# Patient Record
Sex: FEMALE | Race: OTHER | Employment: UNEMPLOYED | URBAN - METROPOLITAN AREA
[De-identification: names, ages, dates, MRNs, and addresses within clinical notes are randomized per-mention and may not be internally consistent; named-entity substitution may affect disease eponyms.]

---

## 2021-05-18 ENCOUNTER — APPOINTMENT (OUTPATIENT)
Dept: RADIOLOGY | Facility: MEDICAL CENTER | Age: 51
End: 2021-05-18
Attending: INTERNAL MEDICINE

## 2021-06-04 ENCOUNTER — HOSPITAL ENCOUNTER (OUTPATIENT)
Dept: LAB | Facility: MEDICAL CENTER | Age: 51
End: 2021-06-04
Attending: INTERNAL MEDICINE
Payer: COMMERCIAL

## 2021-06-04 LAB
ALBUMIN SERPL BCP-MCNC: 4.4 G/DL (ref 3.2–4.9)
ALBUMIN/GLOB SERPL: 1.6 G/DL
ALP SERPL-CCNC: 53 U/L (ref 30–99)
ALT SERPL-CCNC: 13 U/L (ref 2–50)
ANION GAP SERPL CALC-SCNC: 11 MMOL/L (ref 7–16)
AST SERPL-CCNC: 17 U/L (ref 12–45)
BASOPHILS # BLD AUTO: 0.8 % (ref 0–1.8)
BASOPHILS # BLD: 0.04 K/UL (ref 0–0.12)
BILIRUB SERPL-MCNC: 0.6 MG/DL (ref 0.1–1.5)
BUN SERPL-MCNC: 20 MG/DL (ref 8–22)
CALCIUM SERPL-MCNC: 9.4 MG/DL (ref 8.4–10.2)
CHLORIDE SERPL-SCNC: 103 MMOL/L (ref 96–112)
CHOLEST SERPL-MCNC: 230 MG/DL (ref 100–199)
CO2 SERPL-SCNC: 25 MMOL/L (ref 20–33)
CREAT SERPL-MCNC: 0.64 MG/DL (ref 0.5–1.4)
EOSINOPHIL # BLD AUTO: 0.36 K/UL (ref 0–0.51)
EOSINOPHIL NFR BLD: 7.6 % (ref 0–6.9)
ERYTHROCYTE [DISTWIDTH] IN BLOOD BY AUTOMATED COUNT: 42.8 FL (ref 35.9–50)
FASTING STATUS PATIENT QL REPORTED: NORMAL
GLOBULIN SER CALC-MCNC: 2.7 G/DL (ref 1.9–3.5)
GLUCOSE SERPL-MCNC: 92 MG/DL (ref 65–99)
HCT VFR BLD AUTO: 42.4 % (ref 37–47)
HDLC SERPL-MCNC: 84 MG/DL
HGB BLD-MCNC: 14.2 G/DL (ref 12–16)
IMM GRANULOCYTES # BLD AUTO: 0.01 K/UL (ref 0–0.11)
IMM GRANULOCYTES NFR BLD AUTO: 0.2 % (ref 0–0.9)
LDLC SERPL CALC-MCNC: 131 MG/DL
LYMPHOCYTES # BLD AUTO: 2.29 K/UL (ref 1–4.8)
LYMPHOCYTES NFR BLD: 48.1 % (ref 22–41)
MCH RBC QN AUTO: 30.9 PG (ref 27–33)
MCHC RBC AUTO-ENTMCNC: 33.5 G/DL (ref 33.6–35)
MCV RBC AUTO: 92.4 FL (ref 81.4–97.8)
MONOCYTES # BLD AUTO: 0.35 K/UL (ref 0–0.85)
MONOCYTES NFR BLD AUTO: 7.4 % (ref 0–13.4)
NEUTROPHILS # BLD AUTO: 1.71 K/UL (ref 2–7.15)
NEUTROPHILS NFR BLD: 35.9 % (ref 44–72)
NRBC # BLD AUTO: 0 K/UL
NRBC BLD-RTO: 0 /100 WBC
PLATELET # BLD AUTO: 267 K/UL (ref 164–446)
PMV BLD AUTO: 10.5 FL (ref 9–12.9)
POTASSIUM SERPL-SCNC: 4.6 MMOL/L (ref 3.6–5.5)
PROT SERPL-MCNC: 7.1 G/DL (ref 6–8.2)
RBC # BLD AUTO: 4.59 M/UL (ref 4.2–5.4)
SODIUM SERPL-SCNC: 139 MMOL/L (ref 135–145)
TRIGL SERPL-MCNC: 76 MG/DL (ref 0–149)
WBC # BLD AUTO: 4.8 K/UL (ref 4.8–10.8)

## 2021-06-04 PROCEDURE — 80053 COMPREHEN METABOLIC PANEL: CPT

## 2021-06-04 PROCEDURE — 36415 COLL VENOUS BLD VENIPUNCTURE: CPT

## 2021-06-04 PROCEDURE — 80061 LIPID PANEL: CPT

## 2021-06-04 PROCEDURE — 85025 COMPLETE CBC W/AUTO DIFF WBC: CPT

## 2021-09-15 ENCOUNTER — TELEPHONE (OUTPATIENT)
Dept: SCHEDULING | Facility: IMAGING CENTER | Age: 51
End: 2021-09-15

## 2021-09-23 ENCOUNTER — OFFICE VISIT (OUTPATIENT)
Dept: MEDICAL GROUP | Facility: MEDICAL CENTER | Age: 51
End: 2021-09-23
Payer: COMMERCIAL

## 2021-09-23 VITALS
SYSTOLIC BLOOD PRESSURE: 102 MMHG | HEIGHT: 64 IN | DIASTOLIC BLOOD PRESSURE: 72 MMHG | TEMPERATURE: 96.8 F | WEIGHT: 133 LBS | HEART RATE: 72 BPM | OXYGEN SATURATION: 99 % | BODY MASS INDEX: 22.71 KG/M2

## 2021-09-23 DIAGNOSIS — F32.1 CURRENT MODERATE EPISODE OF MAJOR DEPRESSIVE DISORDER WITHOUT PRIOR EPISODE (HCC): ICD-10-CM

## 2021-09-23 DIAGNOSIS — Z79.890 MENOPAUSAL SYNDROME ON HORMONE REPLACEMENT THERAPY: ICD-10-CM

## 2021-09-23 DIAGNOSIS — Z12.11 SCREENING FOR COLON CANCER: ICD-10-CM

## 2021-09-23 DIAGNOSIS — N95.1 MENOPAUSAL SYNDROME ON HORMONE REPLACEMENT THERAPY: ICD-10-CM

## 2021-09-23 DIAGNOSIS — Z12.31 ENCOUNTER FOR SCREENING MAMMOGRAM FOR BREAST CANCER: ICD-10-CM

## 2021-09-23 PROBLEM — F32.9 MAJOR DEPRESSIVE DISORDER: Status: ACTIVE | Noted: 2021-09-23

## 2021-09-23 PROCEDURE — 99204 OFFICE O/P NEW MOD 45 MIN: CPT | Performed by: PHYSICIAN ASSISTANT

## 2021-09-23 RX ORDER — ESTRADIOL 0.03 MG/D
PATCH, EXTENDED RELEASE TRANSDERMAL
COMMUNITY
Start: 2021-08-26 | End: 2021-10-21

## 2021-09-23 RX ORDER — ESCITALOPRAM OXALATE 10 MG/1
TABLET ORAL
Qty: 30 TABLET | Refills: 2 | Status: SHIPPED | OUTPATIENT
Start: 2021-09-23 | End: 2022-02-01

## 2021-09-23 ASSESSMENT — PATIENT HEALTH QUESTIONNAIRE - PHQ9
5. POOR APPETITE OR OVEREATING: 2 - MORE THAN HALF THE DAYS
SUM OF ALL RESPONSES TO PHQ QUESTIONS 1-9: 19
CLINICAL INTERPRETATION OF PHQ2 SCORE: 3

## 2021-09-23 ASSESSMENT — FIBROSIS 4 INDEX: FIB4 SCORE: 0.94

## 2021-09-23 NOTE — ASSESSMENT & PLAN NOTE
Gyn has given prempro 0.625/2.5mg  And has breast tenderness    Then stopped and told to take Estradiol trandermal 0.025mg 2x weekly.  Not taking either

## 2021-09-23 NOTE — PROGRESS NOTES
Subjective:   Uzma Anna is a 51 y.o. female here today for Establishing care      HPI    Patient is a pleasant 51-year-old female who comes in for establishing care.  Is tearful and discusses that she lost her  who was her best friend in December 2020 from Covid.  She has been struggling since then crying multiple times a day.  She states she was left with a few homes and is trying to deal with the finances.  She is also taking care of her elderly mother.  She states although she was more suicidal in thought  a few months ago, it is improving.  She does have some trouble sleeping.      She also reports a  history of vasomotor symptoms of menopause.  She has seen gynecology and was given Prempro  0.625 mg /2.5 mg as hormone replacement therapy.  She had never gotten a mammogram so she stopped this and then was given estradiol transdermal 0.025 mg 2 times weekly.  She also stopped this but notices that she does get night sweats and insomnia at night        Current medicines (including changes today)  Current Outpatient Medications   Medication Sig Dispense Refill   • escitalopram (LEXAPRO) 10 MG Tab Take 1/2 pill daily for 2 weeks then increase to 1 pill daily 30 Tablet 2   • LIZANDRO 0.025 MG/24HR PATCH BIWEEKLY        No current facility-administered medications for this visit.     She  has a past medical history of Major depressive disorder (9/23/2021). She also has no past medical history of ASTHMA, CAD (coronary artery disease), Cancer (HCC), Congestive heart failure (HCC), COPD, Diabetes, Hypertension, Infectious disease, Liver disease, Renal disorder, Seizure disorder (HCC), or Stroke ([facility]).  Patient has no allergy information on record.     Social History and Family History were reviewed and updated.    ROS   No headaches, chest pain, no shortness of breath, abdominal pain, nausea, or vomiting.  All other systems were reviewed and are negative or noted as positive in the HPI.       Objective:  "    /72   Pulse 72   Temp 36 °C (96.8 °F) (Temporal)   Ht 1.626 m (5' 4\")   Wt 60.3 kg (133 lb)   SpO2 99%  Body mass index is 22.83 kg/m².     Physical Exam:  Constitutional: ANO x3, no acute distress, well-nourished, well-hydrated   Skin: Warm, dry, good turgor, no rashes in visible areas.  HEENT: Is normocephalic atraumatic, good PERRLA, extraocular movements intact, TMs and oropharynx are clear with good dentition   Neck: Soft and supple, trachea midline, no masses.  No thyroid megaly or cervical lymphadenopathy noted  Cardiovascular: Regular rate and rhythm.  Normal S1 and 2, no murmurs, rubs or gallops.  No edema noted  Lungs: Clear to auscultation bilaterally.  No wheezes, rales or rhonchi.  Good inspiratory and expiratory breath sounds  Abdomen: Soft, non-tender, no masses.  No hepatosplenomegaly.  Negative Choudhary's  Psych: Alert and oriented x3, normal affect and mood.  Neuro: Cranial nerves II through XII were assessed and intact.  Normal gait, normal cerebellar function noted  Musculoskeletal: Full range of motion, good strength against resistance    Clinical Course/Lab Analysis:        Assessment and Plan:   The following treatment plan was discussed.  Signs and symptoms for which to return were discussed with patient at length.  Patient verbalized understanding.    1. Menopausal syndrome on hormone replacement therapy  Continue estradiol transdermal patch 0.025 mg 2 times weekly.  I do think this will help both symptoms of insomnia and hot flashes.  Continue care with gynecology    2. Encounter for screening mammogram for breast cancer  - [facility]-SCREENING MAMMO BILAT W/TOMOSYNTHESIS W/CAD; Future    [facility]    4. Current moderate episode of major depressive disorder without prior episode (HCC)  -This is a new diagnosis for patient and unstable  phq9 was done and was 19.  We will start medication.  Discussed side effect profile.  If tolerable then " please stop medication and notify me.  Otherwise follow-up in a month to discuss further  - Patient has been identified as having a positive depression screening. Appropriate orders and counseling have been given.  - escitalopram (LEXAPRO) 10 MG Tab; Take 1/2 pill daily for 2 weeks then increase to 1 pill daily  Dispense: 30 Tablet; Refill: 2    Other orders  - LIZANDRO 0.025 MG/24HR PATCH BIWEEKLY         Followup: 1 month    Please note that this dictation was created using voice recognition software. I have made every reasonable attempt to correct obvious errors, but I expect that there are errors of grammar and possibly content that I did not discover before finalizing the note.

## 2021-10-21 ENCOUNTER — OFFICE VISIT (OUTPATIENT)
Dept: MEDICAL GROUP | Facility: MEDICAL CENTER | Age: 51
End: 2021-10-21
Payer: COMMERCIAL

## 2021-10-21 VITALS
BODY MASS INDEX: 22.88 KG/M2 | OXYGEN SATURATION: 100 % | TEMPERATURE: 97.7 F | SYSTOLIC BLOOD PRESSURE: 114 MMHG | WEIGHT: 134 LBS | HEIGHT: 64 IN | HEART RATE: 74 BPM | DIASTOLIC BLOOD PRESSURE: 70 MMHG

## 2021-10-21 DIAGNOSIS — D22.9 ATYPICAL MOLE: ICD-10-CM

## 2021-10-21 DIAGNOSIS — R23.2 HOT FLASHES: ICD-10-CM

## 2021-10-21 PROCEDURE — 99214 OFFICE O/P EST MOD 30 MIN: CPT | Performed by: PHYSICIAN ASSISTANT

## 2021-10-21 RX ORDER — ESTROGEN,CON/M-PROGEST ACET 0.3-1.5MG
1 TABLET ORAL DAILY
Qty: 30 TABLET | Refills: 3 | Status: SHIPPED | OUTPATIENT
Start: 2021-10-21 | End: 2022-02-01 | Stop reason: SDUPTHER

## 2021-10-21 ASSESSMENT — FIBROSIS 4 INDEX: FIB4 SCORE: 0.94

## 2021-11-09 DIAGNOSIS — Z00.00 HEALTHCARE MAINTENANCE: ICD-10-CM

## 2022-01-05 ENCOUNTER — HOSPITAL ENCOUNTER (OUTPATIENT)
Dept: RADIOLOGY | Facility: MEDICAL CENTER | Age: 52
End: 2022-01-05
Attending: PHYSICIAN ASSISTANT
Payer: COMMERCIAL

## 2022-01-05 DIAGNOSIS — Z12.31 ENCOUNTER FOR SCREENING MAMMOGRAM FOR BREAST CANCER: ICD-10-CM

## 2022-01-05 PROCEDURE — 77063 BREAST TOMOSYNTHESIS BI: CPT

## 2022-02-01 ENCOUNTER — OFFICE VISIT (OUTPATIENT)
Dept: MEDICAL GROUP | Facility: MEDICAL CENTER | Age: 52
End: 2022-02-01
Payer: COMMERCIAL

## 2022-02-01 VITALS
HEIGHT: 64 IN | TEMPERATURE: 96.6 F | OXYGEN SATURATION: 98 % | HEART RATE: 67 BPM | WEIGHT: 133 LBS | DIASTOLIC BLOOD PRESSURE: 64 MMHG | BODY MASS INDEX: 22.71 KG/M2 | SYSTOLIC BLOOD PRESSURE: 102 MMHG

## 2022-02-01 DIAGNOSIS — R23.2 HOT FLASHES: ICD-10-CM

## 2022-02-01 DIAGNOSIS — F32.1 CURRENT MODERATE EPISODE OF MAJOR DEPRESSIVE DISORDER WITHOUT PRIOR EPISODE (HCC): ICD-10-CM

## 2022-02-01 DIAGNOSIS — N95.1 VASOMOTOR SYMPTOMS DUE TO MENOPAUSE: ICD-10-CM

## 2022-02-01 DIAGNOSIS — Z00.00 HEALTHCARE MAINTENANCE: ICD-10-CM

## 2022-02-01 DIAGNOSIS — N95.1 MENOPAUSAL SYNDROME ON HORMONE REPLACEMENT THERAPY: ICD-10-CM

## 2022-02-01 DIAGNOSIS — K21.9 GASTROESOPHAGEAL REFLUX DISEASE WITHOUT ESOPHAGITIS: ICD-10-CM

## 2022-02-01 DIAGNOSIS — Z79.890 MENOPAUSAL SYNDROME ON HORMONE REPLACEMENT THERAPY: ICD-10-CM

## 2022-02-01 PROCEDURE — 99214 OFFICE O/P EST MOD 30 MIN: CPT | Performed by: PHYSICIAN ASSISTANT

## 2022-02-01 RX ORDER — ESTROGEN,CON/M-PROGEST ACET 0.3-1.5MG
1 TABLET ORAL DAILY
Qty: 30 TABLET | Refills: 3 | Status: SHIPPED | OUTPATIENT
Start: 2022-02-01 | End: 2022-06-22

## 2022-02-01 RX ORDER — OMEPRAZOLE 40 MG/1
40 CAPSULE, DELAYED RELEASE ORAL DAILY
Qty: 30 CAPSULE | Refills: 5 | Status: SHIPPED
Start: 2022-02-01 | End: 2022-08-23

## 2022-02-01 ASSESSMENT — PATIENT HEALTH QUESTIONNAIRE - PHQ9: CLINICAL INTERPRETATION OF PHQ2 SCORE: 0

## 2022-02-01 ASSESSMENT — FIBROSIS 4 INDEX: FIB4 SCORE: 0.94

## 2022-02-01 NOTE — PROGRESS NOTES
Subjective:   Uzma Anna is a 51 y.o. female here today for follow up    Menopausal syndrome on hormone replacement therapy  Patient states Prempro helps decrease hot flashes, night sweats and insomnia.  She states she has to pay $60 out-of-pocket and is wondering if there is a cheaper alternative.  She has not gone to gynecology as of yet.  Did give her a referral approximately 6 months ago.  She did get mammogram done and this was normal    Major depressive disorder  She has not been taking Lexapro for few months.  Feels that hormone replacement therapy has helped with some of her symptoms.  She has also been working on the grieving process of the death of her  and feels much improved.  She does not wish for refill of medication.  Denies homicidal ideation or suicidal ideation.  Walks frequently with friends 4 to 6 miles and this helps her         Current medicines (including changes today)  Current Outpatient Medications   Medication Sig Dispense Refill   • estrogen, conjugated,-medroxyprogesterone (PREMPRO) 0.3-1.5 MG per tablet Take 1 Tablet by mouth every day. 30 Tablet 3   • omeprazole (PRILOSEC) 40 MG delayed-release capsule Take 1 Capsule by mouth every day. 30 Capsule 5     No current facility-administered medications for this visit.     She  has a past medical history of Major depressive disorder (9/23/2021). She also has no past medical history of ASTHMA, CAD (coronary artery disease), Cancer (HCC), Congestive heart failure (HCC), COPD, Diabetes, Hypertension, Infectious disease, Liver disease, Renal disorder, Seizure disorder (HCC), or Stroke (HCC).  Patient has no allergy information on record.     Social History and Family History were reviewed and updated.    ROS   No headaches, chest pain, no shortness of breath, abdominal pain, nausea, or vomiting.  All other systems were reviewed and are negative or noted as positive in the HPI.       Objective:     /64 (BP Location: Right  "arm, Patient Position: Sitting, BP Cuff Size: Adult)   Pulse 67   Temp 35.9 °C (96.6 °F) (Temporal)   Ht 1.626 m (5' 4\")   Wt 60.3 kg (133 lb)   SpO2 98%  Body mass index is 22.83 kg/m².     Physical Exam:  Constitutional: ANO x3, no acute distress, well-nourished, well-hydrated   Skin: Warm, dry, good turgor, no rashes in visible areas.  HEENT: Is normocephalic atraumatic, good PERRLA, extraocular movements intact, TMs and oropharynx are clear with good dentition   Neck: Soft and supple, trachea midline, no masses.  No thyroid megaly or cervical lymphadenopathy noted  Cardiovascular: Regular rate and rhythm.  Normal S1 and 2, no murmurs, rubs or gallops.  No edema noted  Lungs: Clear to auscultation bilaterally.  No wheezes, rales or rhonchi.  Good inspiratory and expiratory breath sounds  Abdomen: Soft, non-tender, no masses.  No hepatosplenomegaly.  Negative Choudhary's  Psych: Alert and oriented x3, normal affect and mood.  Neuro: Cranial nerves II through XII were assessed and intact.  Normal gait, normal cerebellar function noted  Musculoskeletal: Full range of motion, good strength against resistance    Clinical Course/Lab Analysis:        Assessment and Plan:   The following treatment plan was discussed.  Signs and symptoms for which to return were discussed with patient at length.  Patient verbalized understanding.    1. Healthcare maintenance    2. Hot flashes  - estrogen, conjugated,-medroxyprogesterone (PREMPRO) 0.3-1.5 MG per tablet; Take 1 Tablet by mouth every day.  Dispense: 30 Tablet; Refill: 3    3. Vasomotor symptoms due to menopause  - estrogen, conjugated,-medroxyprogesterone (PREMPRO) 0.3-1.5 MG per tablet; Take 1 Tablet by mouth every day.  Dispense: 30 Tablet; Refill: 3    4. Gastroesophageal reflux disease without esophagitis  - omeprazole (PRILOSEC) 40 MG delayed-release capsule; Take 1 Capsule by mouth every day.  Dispense: 30 Capsule; Refill: 5    5. Menopausal syndrome on hormone " replacement therapy    6. Current moderate episode of major depressive disorder without prior episode (HCC)     Chronic and stable.  Patient has not been taking Lexapro 10 mg daily.  Walking helps.  Continue lifestyle modification    Followup: 3 to 6 months or sooner as needed    Please note that this dictation was created using voice recognition software. I have made every reasonable attempt to correct obvious errors, but I expect that there are errors of grammar and possibly content that I did not discover before finalizing the note.

## 2022-02-01 NOTE — ASSESSMENT & PLAN NOTE
Patient states Prempro helps decrease hot flashes, night sweats and insomnia.  She states she has to pay $60 out-of-pocket and is wondering if there is a cheaper alternative.  She has not gone to gynecology as of yet.  Did give her a referral approximately 6 months ago.  She did get mammogram done and this was normal

## 2022-02-01 NOTE — ASSESSMENT & PLAN NOTE
She has not been taking Lexapro for few months.  Feels that hormone replacement therapy has helped with some of her symptoms.  She has also been working on the grieving process of the death of her  and feels much improved.  She does not wish for refill of medication.  Denies homicidal ideation or suicidal ideation.  Walks frequently with friends 4 to 6 miles and this helps her

## 2022-06-18 DIAGNOSIS — N95.1 VASOMOTOR SYMPTOMS DUE TO MENOPAUSE: ICD-10-CM

## 2022-06-18 DIAGNOSIS — R23.2 HOT FLASHES: ICD-10-CM

## 2022-06-22 RX ORDER — ESTROGEN,CON/M-PROGEST ACET 0.3-1.5MG
TABLET ORAL
Qty: 28 TABLET | Refills: 5 | Status: SHIPPED | OUTPATIENT
Start: 2022-06-22 | End: 2022-08-23 | Stop reason: SDUPTHER

## 2022-08-23 ENCOUNTER — OFFICE VISIT (OUTPATIENT)
Dept: MEDICAL GROUP | Facility: MEDICAL CENTER | Age: 52
End: 2022-08-23
Payer: COMMERCIAL

## 2022-08-23 VITALS
HEART RATE: 99 BPM | SYSTOLIC BLOOD PRESSURE: 132 MMHG | DIASTOLIC BLOOD PRESSURE: 84 MMHG | OXYGEN SATURATION: 99 % | TEMPERATURE: 98.1 F

## 2022-08-23 DIAGNOSIS — N95.1 VASOMOTOR SYMPTOMS DUE TO MENOPAUSE: ICD-10-CM

## 2022-08-23 DIAGNOSIS — K21.9 GASTROESOPHAGEAL REFLUX DISEASE WITHOUT ESOPHAGITIS: ICD-10-CM

## 2022-08-23 DIAGNOSIS — Z13.228 SCREENING FOR METABOLIC DISORDER: ICD-10-CM

## 2022-08-23 DIAGNOSIS — Z13.21 ENCOUNTER FOR VITAMIN DEFICIENCY SCREENING: ICD-10-CM

## 2022-08-23 DIAGNOSIS — N95.1 MENOPAUSAL SYNDROME ON HORMONE REPLACEMENT THERAPY: ICD-10-CM

## 2022-08-23 DIAGNOSIS — B00.1 COLD SORE: ICD-10-CM

## 2022-08-23 DIAGNOSIS — Z13.29 SCREENING FOR THYROID DISORDER: ICD-10-CM

## 2022-08-23 DIAGNOSIS — Z13.220 SCREENING FOR LIPID DISORDERS: ICD-10-CM

## 2022-08-23 DIAGNOSIS — R23.2 HOT FLASHES: ICD-10-CM

## 2022-08-23 DIAGNOSIS — Z79.890 MENOPAUSAL SYNDROME ON HORMONE REPLACEMENT THERAPY: ICD-10-CM

## 2022-08-23 DIAGNOSIS — Z13.0 ENCOUNTER FOR SCREENING FOR HEMATOLOGIC DISORDER: ICD-10-CM

## 2022-08-23 DIAGNOSIS — J98.8 RTI (RESPIRATORY TRACT INFECTION): ICD-10-CM

## 2022-08-23 PROCEDURE — 99214 OFFICE O/P EST MOD 30 MIN: CPT | Performed by: PHYSICIAN ASSISTANT

## 2022-08-23 RX ORDER — VALACYCLOVIR HYDROCHLORIDE 1 G/1
TABLET, FILM COATED ORAL
Qty: 20 TABLET | Refills: 2 | Status: SHIPPED | OUTPATIENT
Start: 2022-08-23 | End: 2022-09-30

## 2022-08-23 RX ORDER — ESTROGEN,CON/M-PROGEST ACET 0.3-1.5MG
1 TABLET ORAL DAILY
Qty: 28 TABLET | Refills: 5 | Status: SHIPPED | OUTPATIENT
Start: 2022-08-23 | End: 2022-10-06 | Stop reason: SDUPTHER

## 2022-08-23 RX ORDER — PANTOPRAZOLE SODIUM 40 MG/1
40 TABLET, DELAYED RELEASE ORAL DAILY
Qty: 30 TABLET | Refills: 3 | Status: SHIPPED | OUTPATIENT
Start: 2022-08-23 | End: 2023-11-30 | Stop reason: SDUPTHER

## 2022-08-23 RX ORDER — DEXTROMETHORPHAN HYDROBROMIDE AND PROMETHAZINE HYDROCHLORIDE 15; 6.25 MG/5ML; MG/5ML
SYRUP ORAL
Qty: 180 ML | Refills: 0 | Status: SHIPPED | OUTPATIENT
Start: 2022-08-23 | End: 2022-09-01 | Stop reason: SDUPTHER

## 2022-08-23 RX ORDER — METHYLPREDNISOLONE 4 MG/1
TABLET ORAL
Qty: 21 TABLET | Refills: 0 | Status: SHIPPED | OUTPATIENT
Start: 2022-08-23 | End: 2022-09-30

## 2022-08-23 RX ORDER — AZITHROMYCIN 250 MG/1
TABLET, FILM COATED ORAL
Qty: 6 TABLET | Refills: 0 | Status: SHIPPED | OUTPATIENT
Start: 2022-08-23 | End: 2022-09-30

## 2022-08-24 NOTE — PROGRESS NOTES
Subjective:     Chief Complaint   Patient presents with    Follow-Up     COLD SORE     Uzma Lara Anna is a 52 y.o. female here today to follow to Discuss:    Patient comes in with multiple complaints including   #1:Cold sore: had one 20 years ago but not since  #2.  She reports worsening cough congestion postnasal drip for the last 4 to 7 days since getting back from [facility].  She was given cough cold medicines there that did not help.  She would like an antibiotic.      Current medicines (including changes today)  Current Outpatient Medications   Medication Sig Dispense Refill    azithromycin (ZITHROMAX) 250 MG Tab Take two tablets on day one, then on tablet the following four days 6 Tablet 0    promethazine-dextromethorphan (PROMETHAZINE-DM) 6.25-15 MG/5ML syrup Take 5mls every six hours as needed for cough 180 mL 0    methylPREDNISolone (MEDROL DOSEPAK) 4 MG Tablet Therapy Pack As directed on the packaging label. 21 Tablet 0    pantoprazole (PROTONIX) 40 MG Tablet Delayed Response Take 1 Tablet by mouth every day. 30 Tablet 3    valacyclovir (VALTREX) 1 GM Tab Take 1 tablet by mouth twice daily for seven to ten days 20 Tablet 2    estrogen, conjugated,-medroxyprogesterone (PREMPRO) 0.3-1.5 MG per tablet Take 1 Tablet by mouth every day. 28 Tablet 5     No current facility-administered medications for this visit.     She  has a past medical history of Major depressive disorder (9/23/2021).    She has no past medical history of ASTHMA, CAD (coronary artery disease), Cancer (HCC), Congestive heart failure (HCC), COPD, Diabetes, Hypertension, Infectious disease, Liver disease, Renal disorder, Seizure disorder (HCC), or Stroke (HCC).    ROS  As per listed in the HPI, otherwise negative     Objective:     /84   Pulse 99   Temp 36.7 °C (98.1 °F) (Temporal)   SpO2 99%  There is no height or weight on file to calculate BMI.     Physical Exam:  General: Patient appears well-nourished, well-hydrated,  nontoxic  HEENT, normocephalic atraumatic, PERRLA, extraocular movements intact, nares are patent and clear  Neck: No visible masses, thyromegaly or abnormalities noted  Cardiovascular.  Sitting comfortably without visible signs of edema  Lungs: No cyanosis noted, nondyspneic  Skin: Well perfused without evidence of rash or lesions  Neurological: Cranial nerves II through XII intact, normal gait  Musculoskeletal: Normal range of motion, normal strength and no deficit noted       Assessment and Plan:   The following treatment plan was discussed and signs and symptoms for which to return were discussed with patient.  Patient verbalized understanding.    1. Gastroesophageal reflux disease without esophagitis  Chronic and unstable  - pantoprazole (PROTONIX) 40 MG Tablet Delayed Response; Take 1 Tablet by mouth every day.  Dispense: 30 Tablet; Refill: 3    2. Vasomotor symptoms due to menopause  Chronic and stable  - estrogen, conjugated,-medroxyprogesterone (PREMPRO) 0.3-1.5 MG per tablet; Take 1 Tablet by mouth every day.  Dispense: 28 Tablet; Refill: 5    3. Menopausal syndrome on hormone replacement therapy    - estrogen, conjugated,-medroxyprogesterone (PREMPRO) 0.3-1.5 MG per tablet; Take 1 Tablet by mouth every day.  Dispense: 28 Tablet; Refill: 5    4. RTI (respiratory tract infection)  New and unstable  - azithromycin (ZITHROMAX) 250 MG Tab; Take two tablets on day one, then on tablet the following four days  Dispense: 6 Tablet; Refill: 0  - promethazine-dextromethorphan (PROMETHAZINE-DM) 6.25-15 MG/5ML syrup; Take 5mls every six hours as needed for cough  Dispense: 180 mL; Refill: 0  - methylPREDNISolone (MEDROL DOSEPAK) 4 MG Tablet Therapy Pack; As directed on the packaging label.  Dispense: 21 Tablet; Refill: 0    5. Cold sore  New and unstable  - valacyclovir (VALTREX) 1 GM Tab; Take 1 tablet by mouth twice daily for seven to ten days  Dispense: 20 Tablet; Refill: 2    6. Hot flashes    - estrogen,  conjugated,-medroxyprogesterone (PREMPRO) 0.3-1.5 MG per tablet; Take 1 Tablet by mouth every day.  Dispense: 28 Tablet; Refill: 5    7. Screening for metabolic disorder    - Comp Metabolic Panel; Future    8. Encounter for screening for hematologic disorder    - CBC WITH DIFFERENTIAL; Future  - HEMOGLOBIN A1C; Future    9. Encounter for vitamin deficiency screening      10. Screening for lipid disorders    - LIPID PANEL    11. Screening for thyroid disorder    - TSH WITH REFLEX TO FT4; Future       Followup: Return in about 3 months (around 11/23/2022).         Please note that this dictation was created using voice recognition software. I have made every reasonable attempt to correct obvious errors, but I expect that there are errors of grammar and possibly content that I did not discover before finalizing the note.

## 2022-09-01 DIAGNOSIS — J98.8 RTI (RESPIRATORY TRACT INFECTION): ICD-10-CM

## 2022-09-01 RX ORDER — DEXTROMETHORPHAN HYDROBROMIDE AND PROMETHAZINE HYDROCHLORIDE 15; 6.25 MG/5ML; MG/5ML
SYRUP ORAL
Qty: 180 ML | Refills: 0 | Status: SHIPPED | OUTPATIENT
Start: 2022-09-01 | End: 2022-09-30

## 2022-09-30 ENCOUNTER — OFFICE VISIT (OUTPATIENT)
Dept: MEDICAL GROUP | Facility: MEDICAL CENTER | Age: 52
End: 2022-09-30
Payer: COMMERCIAL

## 2022-09-30 VITALS
HEART RATE: 70 BPM | SYSTOLIC BLOOD PRESSURE: 128 MMHG | WEIGHT: 138.89 LBS | RESPIRATION RATE: 18 BRPM | HEIGHT: 64 IN | OXYGEN SATURATION: 100 % | DIASTOLIC BLOOD PRESSURE: 64 MMHG | TEMPERATURE: 98.3 F | BODY MASS INDEX: 23.71 KG/M2

## 2022-09-30 DIAGNOSIS — N95.1 MENOPAUSAL SYNDROME ON HORMONE REPLACEMENT THERAPY: ICD-10-CM

## 2022-09-30 DIAGNOSIS — Z79.890 MENOPAUSAL SYNDROME ON HORMONE REPLACEMENT THERAPY: ICD-10-CM

## 2022-09-30 DIAGNOSIS — Z91.09 ENVIRONMENTAL ALLERGIES: ICD-10-CM

## 2022-09-30 PROCEDURE — 99214 OFFICE O/P EST MOD 30 MIN: CPT | Performed by: STUDENT IN AN ORGANIZED HEALTH CARE EDUCATION/TRAINING PROGRAM

## 2022-09-30 RX ORDER — AZELASTINE 1 MG/ML
1 SPRAY, METERED NASAL 2 TIMES DAILY
Qty: 30 ML | Refills: 0 | Status: SHIPPED | OUTPATIENT
Start: 2022-09-30 | End: 2022-10-11

## 2022-09-30 RX ORDER — AZELASTINE HYDROCHLORIDE 0.5 MG/ML
1 SOLUTION/ DROPS OPHTHALMIC 2 TIMES DAILY
Qty: 6 ML | Refills: 0 | Status: SHIPPED | OUTPATIENT
Start: 2022-09-30 | End: 2022-10-11

## 2022-09-30 ASSESSMENT — FIBROSIS 4 INDEX: FIB4 SCORE: 0.95

## 2022-09-30 NOTE — PROGRESS NOTES
"Subjective:     CC:Allergies    HPI:   Uzma presents today with    Problem   Environmental Allergies    Patient does not have a history of environmental allergies.  For the last 5 days or so she has had itching in the back of her throat, itchy and watery eyes and nasal congestion.  She tried over-the-counter Flonase and Claritin without any relief.     Menopausal Syndrome On Hormone Replacement Therapy    9/23/21:  Gyn has given prempro 0.625/2.5mg. patient stopped because of left breast tenderness  Then given Estradiol trandermal 0.025mg 2x weekly. Has not been taking for the last few months  2/1/22: Taking Prempro 0.3-1.5 mg/tab.  9/30/22: Prempro 0.3-1.5 mg per tab was previously working, although in the last month or so she has had continued hot flashes         ROS:  Review of Systems   HENT:  Positive for congestion.      Objective:     Exam:  /64 (BP Location: Left arm, Patient Position: Sitting, BP Cuff Size: Adult)   Pulse 70   Temp 36.8 °C (98.3 °F) (Temporal)   Resp 18   Ht 1.626 m (5' 4\")   Wt 63 kg (138 lb 14.2 oz)   SpO2 100%   BMI 23.84 kg/m²  Body mass index is 23.84 kg/m².    Physical Exam  Vitals reviewed.   Constitutional:       General: She is not in acute distress.     Appearance: She is normal weight. She is not toxic-appearing.   HENT:      Head: Normocephalic and atraumatic.      Right Ear: External ear normal.      Left Ear: External ear normal.      Nose: Congestion present.      Mouth/Throat:      Mouth: Mucous membranes are moist.      Pharynx: Oropharynx is clear. No oropharyngeal exudate.   Eyes:      General:         Right eye: No discharge.         Left eye: No discharge.      Extraocular Movements: Extraocular movements intact.      Comments: Redness and watery eyes bilaterally   Cardiovascular:      Rate and Rhythm: Normal rate and regular rhythm.      Pulses: Normal pulses.      Heart sounds: Normal heart sounds. No murmur heard.  Pulmonary:      Effort: Pulmonary " effort is normal. No respiratory distress.      Breath sounds: Normal breath sounds.   Skin:     Capillary Refill: Capillary refill takes less than 2 seconds.   Neurological:      Mental Status: She is alert.   Psychiatric:         Mood and Affect: Mood normal.         Behavior: Behavior normal.         Thought Content: Thought content normal.         Judgment: Judgment normal.       Assessment & Plan:     52 y.o. female with the following -     1. Environmental allergies  Continue to take Claritin and Flonase.  Antihistamine nasal spray and eyedrops given.   - azelastine (OPTIVAR) 0.05 % ophthalmic solution; Administer 1 Drop into both eyes 2 times a day.  Dispense: 6 mL; Refill: 0  - azelastine (ASTELIN) 137 MCG/SPRAY nasal spray; Administer 1 Spray into affected nostril(S) 2 times a day.  Dispense: 30 mL; Refill: 0    2. Menopausal syndrome on hormone replacement therapy  Since she is continuing to have postmenopausal vasomotor symptoms and she is on the lowest dose of Prempro, we can consider increasing at next visit.  She was on the 0.625-2.5 mg tablet and stopped because of left breast tenderness.  We could consider the intermediate dose.  We will see if symptoms improve over the next month and if not we can increase.      Return in about 4 weeks (around 10/28/2022) for new patient appointment.    Please note that this dictation was created using voice recognition software. I have made every reasonable attempt to correct obvious errors, but I expect that there are errors of grammar and possibly content that I did not discover before finalizing the note.

## 2022-10-03 ENCOUNTER — OFFICE VISIT (OUTPATIENT)
Dept: MEDICAL GROUP | Facility: MEDICAL CENTER | Age: 52
End: 2022-10-03
Payer: COMMERCIAL

## 2022-10-03 VITALS
TEMPERATURE: 97.4 F | HEIGHT: 64 IN | SYSTOLIC BLOOD PRESSURE: 120 MMHG | DIASTOLIC BLOOD PRESSURE: 62 MMHG | OXYGEN SATURATION: 98 % | HEART RATE: 71 BPM | WEIGHT: 138.45 LBS | BODY MASS INDEX: 23.64 KG/M2

## 2022-10-03 DIAGNOSIS — E78.00 PURE HYPERCHOLESTEROLEMIA: ICD-10-CM

## 2022-10-03 DIAGNOSIS — N95.1 MENOPAUSAL SYNDROME ON HORMONE REPLACEMENT THERAPY: ICD-10-CM

## 2022-10-03 DIAGNOSIS — F32.5 MAJOR DEPRESSIVE DISORDER WITH SINGLE EPISODE, IN FULL REMISSION (HCC): ICD-10-CM

## 2022-10-03 DIAGNOSIS — Z01.84 IMMUNITY STATUS TESTING: ICD-10-CM

## 2022-10-03 DIAGNOSIS — Z79.890 MENOPAUSAL SYNDROME ON HORMONE REPLACEMENT THERAPY: ICD-10-CM

## 2022-10-03 DIAGNOSIS — Z00.00 ENCOUNTER FOR MEDICAL EXAMINATION TO ESTABLISH CARE: ICD-10-CM

## 2022-10-03 DIAGNOSIS — Z91.09 ENVIRONMENTAL ALLERGIES: ICD-10-CM

## 2022-10-03 PROBLEM — E78.5 HYPERLIPIDEMIA: Status: ACTIVE | Noted: 2022-10-03

## 2022-10-03 PROCEDURE — 99214 OFFICE O/P EST MOD 30 MIN: CPT | Performed by: STUDENT IN AN ORGANIZED HEALTH CARE EDUCATION/TRAINING PROGRAM

## 2022-10-03 RX ORDER — PREDNISONE 20 MG/1
40 TABLET ORAL DAILY
Qty: 10 TABLET | Refills: 0 | Status: SHIPPED | OUTPATIENT
Start: 2022-10-03 | End: 2022-10-08

## 2022-10-03 RX ORDER — MOMETASONE FUROATE 50 UG/1
2 SPRAY, METERED NASAL DAILY
Qty: 1 EACH | Refills: 2 | Status: SHIPPED | OUTPATIENT
Start: 2022-10-03 | End: 2024-03-21

## 2022-10-03 ASSESSMENT — FIBROSIS 4 INDEX: FIB4 SCORE: 0.95

## 2022-10-03 ASSESSMENT — PATIENT HEALTH QUESTIONNAIRE - PHQ9: CLINICAL INTERPRETATION OF PHQ2 SCORE: 0

## 2022-10-06 ENCOUNTER — HOSPITAL ENCOUNTER (OUTPATIENT)
Dept: LAB | Facility: MEDICAL CENTER | Age: 52
End: 2022-10-06
Attending: STUDENT IN AN ORGANIZED HEALTH CARE EDUCATION/TRAINING PROGRAM
Payer: COMMERCIAL

## 2022-10-06 ENCOUNTER — PATIENT MESSAGE (OUTPATIENT)
Dept: MEDICAL GROUP | Facility: MEDICAL CENTER | Age: 52
End: 2022-10-06
Payer: COMMERCIAL

## 2022-10-06 DIAGNOSIS — N95.1 MENOPAUSAL SYNDROME ON HORMONE REPLACEMENT THERAPY: ICD-10-CM

## 2022-10-06 DIAGNOSIS — E78.00 PURE HYPERCHOLESTEROLEMIA: ICD-10-CM

## 2022-10-06 DIAGNOSIS — Z79.890 MENOPAUSAL SYNDROME ON HORMONE REPLACEMENT THERAPY: ICD-10-CM

## 2022-10-06 DIAGNOSIS — Z00.00 ENCOUNTER FOR MEDICAL EXAMINATION TO ESTABLISH CARE: ICD-10-CM

## 2022-10-06 DIAGNOSIS — R23.2 HOT FLASHES: ICD-10-CM

## 2022-10-06 DIAGNOSIS — Z01.84 IMMUNITY STATUS TESTING: ICD-10-CM

## 2022-10-06 DIAGNOSIS — N95.1 VASOMOTOR SYMPTOMS DUE TO MENOPAUSE: ICD-10-CM

## 2022-10-06 LAB
ALBUMIN SERPL BCP-MCNC: 4.4 G/DL (ref 3.2–4.9)
ALBUMIN/GLOB SERPL: 1.6 G/DL
ALP SERPL-CCNC: 50 U/L (ref 30–99)
ALT SERPL-CCNC: 14 U/L (ref 2–50)
ANION GAP SERPL CALC-SCNC: 11 MMOL/L (ref 7–16)
AST SERPL-CCNC: 14 U/L (ref 12–45)
BASOPHILS # BLD AUTO: 0.8 % (ref 0–1.8)
BASOPHILS # BLD: 0.07 K/UL (ref 0–0.12)
BILIRUB SERPL-MCNC: 0.5 MG/DL (ref 0.1–1.5)
BUN SERPL-MCNC: 21 MG/DL (ref 8–22)
CALCIUM SERPL-MCNC: 9.4 MG/DL (ref 8.4–10.2)
CHLORIDE SERPL-SCNC: 107 MMOL/L (ref 96–112)
CHOLEST SERPL-MCNC: 249 MG/DL (ref 100–199)
CO2 SERPL-SCNC: 25 MMOL/L (ref 20–33)
CREAT SERPL-MCNC: 0.6 MG/DL (ref 0.5–1.4)
EOSINOPHIL # BLD AUTO: 0.16 K/UL (ref 0–0.51)
EOSINOPHIL NFR BLD: 1.8 % (ref 0–6.9)
ERYTHROCYTE [DISTWIDTH] IN BLOOD BY AUTOMATED COUNT: 46.5 FL (ref 35.9–50)
FASTING STATUS PATIENT QL REPORTED: NORMAL
GFR SERPLBLD CREATININE-BSD FMLA CKD-EPI: 106 ML/MIN/1.73 M 2
GLOBULIN SER CALC-MCNC: 2.8 G/DL (ref 1.9–3.5)
GLUCOSE SERPL-MCNC: 94 MG/DL (ref 65–99)
HBV SURFACE AB SERPL IA-ACNC: <3.5 MIU/ML (ref 0–10)
HCT VFR BLD AUTO: 42.6 % (ref 37–47)
HDLC SERPL-MCNC: 95 MG/DL
HGB BLD-MCNC: 14.5 G/DL (ref 12–16)
IMM GRANULOCYTES # BLD AUTO: 0.03 K/UL (ref 0–0.11)
IMM GRANULOCYTES NFR BLD AUTO: 0.3 % (ref 0–0.9)
LDLC SERPL CALC-MCNC: 138 MG/DL
LYMPHOCYTES # BLD AUTO: 4.1 K/UL (ref 1–4.8)
LYMPHOCYTES NFR BLD: 47 % (ref 22–41)
MCH RBC QN AUTO: 31.5 PG (ref 27–33)
MCHC RBC AUTO-ENTMCNC: 34 G/DL (ref 33.6–35)
MCV RBC AUTO: 92.4 FL (ref 81.4–97.8)
MONOCYTES # BLD AUTO: 0.47 K/UL (ref 0–0.85)
MONOCYTES NFR BLD AUTO: 5.4 % (ref 0–13.4)
NEUTROPHILS # BLD AUTO: 3.89 K/UL (ref 2–7.15)
NEUTROPHILS NFR BLD: 44.7 % (ref 44–72)
NRBC # BLD AUTO: 0 K/UL
NRBC BLD-RTO: 0 /100 WBC
PLATELET # BLD AUTO: 299 K/UL (ref 164–446)
PMV BLD AUTO: 10.8 FL (ref 9–12.9)
POTASSIUM SERPL-SCNC: 3.5 MMOL/L (ref 3.6–5.5)
PROT SERPL-MCNC: 7.2 G/DL (ref 6–8.2)
RBC # BLD AUTO: 4.61 M/UL (ref 4.2–5.4)
SODIUM SERPL-SCNC: 143 MMOL/L (ref 135–145)
TRIGL SERPL-MCNC: 81 MG/DL (ref 0–149)
WBC # BLD AUTO: 8.7 K/UL (ref 4.8–10.8)

## 2022-10-06 PROCEDURE — 80053 COMPREHEN METABOLIC PANEL: CPT

## 2022-10-06 PROCEDURE — 36415 COLL VENOUS BLD VENIPUNCTURE: CPT

## 2022-10-06 PROCEDURE — 86706 HEP B SURFACE ANTIBODY: CPT

## 2022-10-06 PROCEDURE — 80061 LIPID PANEL: CPT

## 2022-10-06 PROCEDURE — 85025 COMPLETE CBC W/AUTO DIFF WBC: CPT

## 2022-10-06 RX ORDER — ESTROGEN,CON/M-PROGEST ACET 0.3-1.5MG
1 TABLET ORAL DAILY
Qty: 28 TABLET | Refills: 5 | Status: SHIPPED | OUTPATIENT
Start: 2022-10-06 | End: 2022-10-11 | Stop reason: SDUPTHER

## 2022-10-11 ENCOUNTER — OFFICE VISIT (OUTPATIENT)
Dept: MEDICAL GROUP | Facility: MEDICAL CENTER | Age: 52
End: 2022-10-11
Payer: COMMERCIAL

## 2022-10-11 VITALS
WEIGHT: 138.45 LBS | SYSTOLIC BLOOD PRESSURE: 112 MMHG | HEIGHT: 64 IN | OXYGEN SATURATION: 96 % | TEMPERATURE: 98.2 F | RESPIRATION RATE: 16 BRPM | HEART RATE: 80 BPM | DIASTOLIC BLOOD PRESSURE: 60 MMHG | BODY MASS INDEX: 23.64 KG/M2

## 2022-10-11 DIAGNOSIS — N95.1 MENOPAUSAL SYNDROME ON HORMONE REPLACEMENT THERAPY: ICD-10-CM

## 2022-10-11 DIAGNOSIS — E78.00 PURE HYPERCHOLESTEROLEMIA: ICD-10-CM

## 2022-10-11 DIAGNOSIS — Z79.890 MENOPAUSAL SYNDROME ON HORMONE REPLACEMENT THERAPY: ICD-10-CM

## 2022-10-11 DIAGNOSIS — H57.9 ITCHY EYES: ICD-10-CM

## 2022-10-11 DIAGNOSIS — Z91.09 ENVIRONMENTAL ALLERGIES: ICD-10-CM

## 2022-10-11 PROBLEM — F32.9 MAJOR DEPRESSIVE DISORDER: Status: RESOLVED | Noted: 2021-09-23 | Resolved: 2022-10-11

## 2022-10-11 PROBLEM — E78.5 HYPERLIPIDEMIA: Status: RESOLVED | Noted: 2022-10-11 | Resolved: 2022-10-11

## 2022-10-11 PROBLEM — E78.5 HYPERLIPIDEMIA: Status: ACTIVE | Noted: 2022-10-11

## 2022-10-11 PROCEDURE — 99214 OFFICE O/P EST MOD 30 MIN: CPT | Performed by: STUDENT IN AN ORGANIZED HEALTH CARE EDUCATION/TRAINING PROGRAM

## 2022-10-11 RX ORDER — ESTROGEN,CON/M-PROGEST ACET 0.3-1.5MG
1 TABLET ORAL DAILY
Qty: 28 TABLET | Refills: 5 | Status: SHIPPED | OUTPATIENT
Start: 2022-10-11 | End: 2023-11-22 | Stop reason: SDUPTHER

## 2022-10-11 RX ORDER — PREDNISONE 20 MG/1
20 TABLET ORAL DAILY
Qty: 5 TABLET | Refills: 0 | Status: SHIPPED | OUTPATIENT
Start: 2022-10-11 | End: 2022-10-16

## 2022-10-11 RX ORDER — LEVOCETIRIZINE DIHYDROCHLORIDE 5 MG/1
1 TABLET, FILM COATED ORAL DAILY
Qty: 30 TABLET | Refills: 0 | Status: SHIPPED | OUTPATIENT
Start: 2022-10-11 | End: 2023-03-14 | Stop reason: SDUPTHER

## 2022-10-11 RX ORDER — KETOTIFEN FUMARATE 0.25 MG/ML
1 SOLUTION/ DROPS OPHTHALMIC 2 TIMES DAILY
Qty: 10 ML | Refills: 0 | Status: SHIPPED | OUTPATIENT
Start: 2022-10-11 | End: 2023-03-14 | Stop reason: SDUPTHER

## 2022-10-11 RX ORDER — SIMVASTATIN 20 MG
20 TABLET ORAL NIGHTLY
Qty: 30 TABLET | Refills: 11 | Status: SHIPPED | OUTPATIENT
Start: 2022-10-11 | End: 2024-03-21

## 2022-10-11 ASSESSMENT — FIBROSIS 4 INDEX: FIB4 SCORE: 0.68

## 2022-10-11 ASSESSMENT — PATIENT HEALTH QUESTIONNAIRE - PHQ9: CLINICAL INTERPRETATION OF PHQ2 SCORE: 0

## 2022-10-11 NOTE — PROGRESS NOTES
Subjective:     CC: Allergies    HPI:   Uzma presents today with    Problem   Pure Hypercholesterolemia    10/17/2022 total cholesterol 249, triglycerides 81, HDL 95,   6/4/2021 total cholesterol 230, triglycerides 76, HDL 84,        Environmental Allergies    Patient does not have a history of environmental allergies.  For the last 5 days or so she has had itching in the back of her throat, itchy and watery eyes and nasal congestion.  She tried over-the-counter Flonase and Claritin without any relief.  Added azelastine nasal spray and eyedrops 9/30/2022.  Started oral steroids 10/3/2022 with good relief.  As soon as her prednisone ran out her symptoms began returning.  She has stopped taking Claritin and is still using azelastine eyedrops.  She uses mometasone nasal spray as needed with little relief.     Hyperlipidemia (Resolved)   Major Depressive Disorder (Resolved)    9/23/21 Depression Screening    Little interest or pleasure in doing things?  0 - not at all   Feeling down, depressed , or hopeless? 3 - nearly every day   Trouble falling or staying asleep, or sleeping too much?  3 - nearly every day   Feeling tired or having little energy?  2 - more than half the days   Poor appetite or overeating?  2 - more than half the days   Feeling bad about yourself - or that you are a failure or have let yourself or your family down? 3 - nearly every day   Trouble concentrating on things, such as reading the newspaper or watching television? 2 - more than half the days   Moving or speaking so slowly that other people could have noticed.  Or the opposite - being so fidgety or restless that you have been moving around a lot more than usual?  1 - several days   Thoughts that you would be better off dead, or of hurting yourself?  3 - nearly every day   Patient Health Questionnaire Score: 19       If depressive symptoms identified deferred to follow up visit unless specifically addressed in assesment and  "plan.    Interpretation of PHQ-9 Total Score   Score Severity   1-4 No Depression   5-9 Mild Depression   10-14 Moderate Depression   15-19 Moderately Severe Depression   20-27 Severe Depression             Health Maintenance: Completed    ROS:  ROS    Objective:     Exam:  /60 (BP Location: Left arm, Patient Position: Sitting, BP Cuff Size: Adult)   Pulse 80   Temp 36.8 °C (98.2 °F) (Temporal)   Resp 16   Ht 1.626 m (5' 4\")   Wt 62.8 kg (138 lb 7.2 oz)   SpO2 96%   BMI 23.76 kg/m²  Body mass index is 23.76 kg/m².    Physical Exam  Constitutional:       General: She is not in acute distress.     Appearance: She is normal weight. She is not toxic-appearing.   HENT:      Head: Normocephalic and atraumatic.      Right Ear: External ear normal.      Left Ear: External ear normal.   Eyes:      General: Lids are normal. Allergic shiner present.         Right eye: No discharge.         Left eye: No discharge.      Extraocular Movements: Extraocular movements intact.      Conjunctiva/sclera:      Right eye: Right conjunctiva is injected (mild).      Left eye: Left conjunctiva is injected (mild).      Comments: Watery eyes bilaterally   Neurological:      Mental Status: She is alert.         Assessment & Plan:     52 y.o. female with the following -     Patient has now failed a trial of oral antihistamines, nasal antihistamines, nasal steroids, antihistamine ophthalmic solution.  She has only had relief while taking steroids, but this relief was short-lived.  She cannot continuously take p.o. steroids.  I will give her 1 more course, a new prescription for Xyzal and told her to discontinue her Zyrtec.  She will discontinue her azelastine eyedrops and start her ketotifen eyedrops.  Continue steroid nasal spray.  I have referred her to both ophthalmology and allergy clinic because I cannot get control of her eye symptoms or allergy symptoms.  She does have hypercholesterolemia and will start a statin today.    1. " Itchy eyes  - Referral to Ophthalmology  - Levocetirizine Dihydrochloride 5 MG Tab; Take 1 Tablet by mouth every day.  Dispense: 30 Tablet; Refill: 0  - ketotifen (ZADITOR) 0.025 % ophthalmic solution; Administer 1 Drop into both eyes 2 times a day.  Dispense: 10 mL; Refill: 0  - Referral to Allergy    2. Environmental allergies  - Levocetirizine Dihydrochloride 5 MG Tab; Take 1 Tablet by mouth every day.  Dispense: 30 Tablet; Refill: 0  - predniSONE (DELTASONE) 20 MG Tab; Take 1 Tablet by mouth every day for 5 days.  Dispense: 5 Tablet; Refill: 0  - Referral to Allergy    3. Menopausal syndrome on hormone replacement therapy  - estrogen, conjugated,-medroxyprogesterone (PREMPRO) 0.3-1.5 MG per tablet; Take 1 Tablet by mouth every day.  Dispense: 28 Tablet; Refill: 5    4. Pure hypercholesterolemia  - simvastatin (ZOCOR) 20 MG Tab; Take 1 Tablet by mouth every evening.  Dispense: 30 Tablet; Refill: 11      No follow-ups on file.    Please note that this dictation was created using voice recognition software. I have made every reasonable attempt to correct obvious errors, but I expect that there are errors of grammar and possibly content that I did not discover before finalizing the note.

## 2022-10-17 ENCOUNTER — APPOINTMENT (OUTPATIENT)
Dept: MEDICAL GROUP | Facility: MEDICAL CENTER | Age: 52
End: 2022-10-17
Payer: COMMERCIAL

## 2022-10-18 ENCOUNTER — OFFICE VISIT (OUTPATIENT)
Dept: MEDICAL GROUP | Facility: MEDICAL CENTER | Age: 52
End: 2022-10-18
Payer: COMMERCIAL

## 2022-10-18 VITALS
SYSTOLIC BLOOD PRESSURE: 114 MMHG | HEIGHT: 64 IN | OXYGEN SATURATION: 98 % | HEART RATE: 96 BPM | BODY MASS INDEX: 25.1 KG/M2 | WEIGHT: 147.05 LBS | TEMPERATURE: 98.1 F | DIASTOLIC BLOOD PRESSURE: 68 MMHG

## 2022-10-18 DIAGNOSIS — Z91.09 ENVIRONMENTAL ALLERGIES: ICD-10-CM

## 2022-10-18 PROCEDURE — 99213 OFFICE O/P EST LOW 20 MIN: CPT | Performed by: STUDENT IN AN ORGANIZED HEALTH CARE EDUCATION/TRAINING PROGRAM

## 2022-10-18 ASSESSMENT — FIBROSIS 4 INDEX: FIB4 SCORE: 0.68

## 2022-10-18 NOTE — PROGRESS NOTES
"Subjective:     CC: Seasonal allergies    HPI:   Uzma presents today with    Problem   Environmental Allergies    Patient does not have a history of environmental allergies.  For the last 5 days or so she has had itching in the back of her throat, itchy and watery eyes and nasal congestion.  She tried over-the-counter Flonase and Claritin without any relief.  Added azelastine nasal spray and eyedrops 9/30/2022.  Started oral steroids 10/3/2022 with good relief.  As soon as her prednisone ran out her symptoms began returning.  She has stopped taking Claritin and is still using azelastine eyedrops.  She uses mometasone nasal spray as needed with little relief.  10/11/2022 switched to ketotifen eye drops, 5 more days of oral steroid, symptoms 80% improved         Health Maintenance: Completed    ROS:  ROS    Objective:     Exam:  /68 (BP Location: Left arm, Patient Position: Sitting, BP Cuff Size: Adult)   Pulse 96   Temp 36.7 °C (98.1 °F) (Temporal)   Ht 1.626 m (5' 4\")   Wt 66.7 kg (147 lb 0.8 oz)   SpO2 98%   BMI 25.24 kg/m²  Body mass index is 25.24 kg/m².    Physical Exam  Vitals reviewed.   Constitutional:       Appearance: She is normal weight.   HENT:      Head: Normocephalic and atraumatic.   Eyes:      General:         Right eye: No discharge.         Left eye: No discharge.      Extraocular Movements: Extraocular movements intact.      Conjunctiva/sclera: Conjunctivae normal.   Pulmonary:      Effort: Pulmonary effort is normal. No respiratory distress.   Skin:     General: Skin is warm and dry.      Capillary Refill: Capillary refill takes less than 2 seconds.   Neurological:      Mental Status: She is alert.   Psychiatric:         Mood and Affect: Mood normal.         Behavior: Behavior normal.         Thought Content: Thought content normal.         Judgment: Judgment normal.         Assessment & Plan:     52 y.o. female with the following -     1. Environmental allergies  Symptoms now " controlled, continue antihistamine, continue ketotifen eyedrops, phone number for eye doctor and allergist given, patient will call if symptoms return    No follow-ups on file.    Please note that this dictation was created using voice recognition software. I have made every reasonable attempt to correct obvious errors, but I expect that there are errors of grammar and possibly content that I did not discover before finalizing the note.

## 2023-03-14 ENCOUNTER — OFFICE VISIT (OUTPATIENT)
Dept: MEDICAL GROUP | Facility: MEDICAL CENTER | Age: 53
End: 2023-03-14
Payer: COMMERCIAL

## 2023-03-14 VITALS
HEIGHT: 64 IN | OXYGEN SATURATION: 97 % | TEMPERATURE: 98.3 F | WEIGHT: 136.69 LBS | SYSTOLIC BLOOD PRESSURE: 110 MMHG | RESPIRATION RATE: 17 BRPM | DIASTOLIC BLOOD PRESSURE: 70 MMHG | HEART RATE: 78 BPM | BODY MASS INDEX: 23.34 KG/M2

## 2023-03-14 DIAGNOSIS — R05.1 ACUTE COUGH: ICD-10-CM

## 2023-03-14 DIAGNOSIS — Z91.09 ENVIRONMENTAL ALLERGIES: ICD-10-CM

## 2023-03-14 PROCEDURE — 99213 OFFICE O/P EST LOW 20 MIN: CPT | Performed by: STUDENT IN AN ORGANIZED HEALTH CARE EDUCATION/TRAINING PROGRAM

## 2023-03-14 RX ORDER — KETOTIFEN FUMARATE 0.25 MG/ML
1 SOLUTION/ DROPS OPHTHALMIC 2 TIMES DAILY
Qty: 10 ML | Refills: 0 | Status: SHIPPED | OUTPATIENT
Start: 2023-03-14 | End: 2023-09-26 | Stop reason: SDUPTHER

## 2023-03-14 RX ORDER — BENZONATATE 100 MG/1
100 CAPSULE ORAL 3 TIMES DAILY PRN
Qty: 60 CAPSULE | Refills: 0 | Status: SHIPPED | OUTPATIENT
Start: 2023-03-14 | End: 2023-03-21 | Stop reason: SDUPTHER

## 2023-03-14 RX ORDER — LEVOCETIRIZINE DIHYDROCHLORIDE 5 MG/1
1 TABLET, FILM COATED ORAL DAILY
Qty: 30 TABLET | Refills: 0 | Status: SHIPPED | OUTPATIENT
Start: 2023-03-14 | End: 2023-09-26 | Stop reason: SDUPTHER

## 2023-03-14 RX ORDER — GUAIFENESIN AND DEXTROMETHORPHAN HYDROBROMIDE 100; 10 MG/5ML; MG/5ML
5 SOLUTION ORAL EVERY 6 HOURS PRN
Qty: 840 ML | Refills: 0 | Status: SHIPPED | OUTPATIENT
Start: 2023-03-14 | End: 2023-03-15

## 2023-03-14 ASSESSMENT — FIBROSIS 4 INDEX: FIB4 SCORE: 0.69

## 2023-03-14 NOTE — PROGRESS NOTES
"Subjective:     CC: Worsening allergy symptoms, cough    HPI:   Uzma presents today with several days of worsening allergy symptoms.  He typically uses a antihistamine eyedrop and Xyzal as well as a nasal steroid to help with symptoms.  She has not been using these for some time and has noticed that her eyes are starting to get itchy again and congestion is returning.    She did develop a cough over the last several days.  It is a dry cough, nonproductive, no shortness of breath or other symptoms.  Her mom also has a similar cough.    ROS:  ROS    Objective:     Exam:  /70 (BP Location: Left arm, Patient Position: Sitting, BP Cuff Size: Adult)   Pulse 78   Temp 36.8 °C (98.3 °F) (Temporal)   Resp 17   Ht 1.626 m (5' 4\")   Wt 62 kg (136 lb 11 oz)   SpO2 97%   BMI 23.46 kg/m²  Body mass index is 23.46 kg/m².    Physical Exam  Vitals reviewed.   Constitutional:       General: She is not in acute distress.     Appearance: She is not toxic-appearing.   HENT:      Head: Normocephalic and atraumatic.      Right Ear: External ear normal.      Left Ear: External ear normal.   Eyes:      General:         Right eye: No discharge.         Left eye: No discharge.      Extraocular Movements: Extraocular movements intact.      Conjunctiva/sclera: Conjunctivae normal.   Cardiovascular:      Rate and Rhythm: Normal rate and regular rhythm.      Pulses: Normal pulses.      Heart sounds: Normal heart sounds. No murmur heard.  Pulmonary:      Effort: Pulmonary effort is normal. No respiratory distress.      Breath sounds: Normal breath sounds. No wheezing or rales.   Skin:     General: Skin is warm and dry.   Neurological:      Mental Status: She is alert.   Psychiatric:         Mood and Affect: Mood normal.         Behavior: Behavior normal.         Thought Content: Thought content normal.         Judgment: Judgment normal.         Assessment & Plan:     53 y.o. female with the following -     1. Environmental " allergies  Refill of medication sent to pharmacy, provided patient with the phone number for [facility] allergy as she was previously referred there and was unable to get in contact with them  - ketotifen (ZADITOR) 0.025 % ophthalmic solution; Administer 1 Drop into both eyes 2 times a day.  Dispense: 10 mL; Refill: 0  - Levocetirizine Dihydrochloride 5 MG Tab; Take 1 Tablet by mouth every day.  Dispense: 30 Tablet; Refill: 0    2. Acute cough  Likely secondary to viral illness, lungs are clear on exam, medications below sent to pharmacy  - benzonatate (TESSALON) 100 MG Cap; Take 1 Capsule by mouth 3 times a day as needed for Cough.  Dispense: 60 Capsule; Refill: 0  - Dextromethorphan-guaiFENesin (TUSSIN DM)  MG/5ML Syrup; Take 5 mL by mouth every 6 hours as needed (cough, congestion).  Dispense: 840 mL; Refill: 0      No follow-ups on file.    Please note that this dictation was created using voice recognition software. I have made every reasonable attempt to correct obvious errors, but I expect that there are errors of grammar and possibly content that I did not discover before finalizing the note.

## 2023-03-15 ENCOUNTER — TELEPHONE (OUTPATIENT)
Dept: MEDICAL GROUP | Facility: MEDICAL CENTER | Age: 53
End: 2023-03-15
Payer: COMMERCIAL

## 2023-03-15 RX ORDER — DEXTROMETHORPHAN HYDROBROMIDE AND PROMETHAZINE HYDROCHLORIDE 15; 6.25 MG/5ML; MG/5ML
5 SYRUP ORAL EVERY 4 HOURS PRN
Qty: 120 ML | Refills: 0 | Status: SHIPPED | OUTPATIENT
Start: 2023-03-15 | End: 2023-03-21 | Stop reason: SDUPTHER

## 2023-03-21 ENCOUNTER — OFFICE VISIT (OUTPATIENT)
Dept: MEDICAL GROUP | Facility: MEDICAL CENTER | Age: 53
End: 2023-03-21
Payer: COMMERCIAL

## 2023-03-21 VITALS
HEART RATE: 78 BPM | BODY MASS INDEX: 23.34 KG/M2 | DIASTOLIC BLOOD PRESSURE: 70 MMHG | WEIGHT: 136.69 LBS | HEIGHT: 64 IN | OXYGEN SATURATION: 100 % | TEMPERATURE: 98.6 F | SYSTOLIC BLOOD PRESSURE: 110 MMHG | RESPIRATION RATE: 17 BRPM

## 2023-03-21 DIAGNOSIS — J40 BRONCHITIS: ICD-10-CM

## 2023-03-21 PROCEDURE — 99213 OFFICE O/P EST LOW 20 MIN: CPT | Performed by: STUDENT IN AN ORGANIZED HEALTH CARE EDUCATION/TRAINING PROGRAM

## 2023-03-21 RX ORDER — BENZONATATE 100 MG/1
100 CAPSULE ORAL 3 TIMES DAILY PRN
Qty: 60 CAPSULE | Refills: 0 | Status: SHIPPED | OUTPATIENT
Start: 2023-03-21

## 2023-03-21 RX ORDER — DOXYCYCLINE HYCLATE 100 MG
100 TABLET ORAL 2 TIMES DAILY
Qty: 14 TABLET | Refills: 0 | Status: SHIPPED | OUTPATIENT
Start: 2023-03-21 | End: 2023-03-28

## 2023-03-21 RX ORDER — DEXTROMETHORPHAN HYDROBROMIDE AND PROMETHAZINE HYDROCHLORIDE 15; 6.25 MG/5ML; MG/5ML
5 SYRUP ORAL EVERY 4 HOURS PRN
Qty: 120 ML | Refills: 0 | Status: SHIPPED | OUTPATIENT
Start: 2023-03-21 | End: 2023-09-15 | Stop reason: SDUPTHER

## 2023-03-21 ASSESSMENT — PATIENT HEALTH QUESTIONNAIRE - PHQ9: CLINICAL INTERPRETATION OF PHQ2 SCORE: 0

## 2023-03-21 ASSESSMENT — FIBROSIS 4 INDEX: FIB4 SCORE: 0.69

## 2023-03-21 NOTE — PROGRESS NOTES
"Subjective:     CC: cough    HPI:   Uzma presents today with worsening cough.  She came in with a cough thought to be secondary to a viral illness a week ago.  Her cough is consistently there and not improving.  She also feels some rattling and congestion in the chest when she coughs.  Her symptoms have now been there for over a week.    Health Maintenance: Completed    ROS:  ROS    Objective:     Exam:  /70 (BP Location: Left arm, Patient Position: Sitting, BP Cuff Size: Adult)   Pulse 78   Temp 37 °C (98.6 °F) (Temporal)   Resp 17   Ht 1.626 m (5' 4\")   Wt 62 kg (136 lb 11 oz)   SpO2 100%   BMI 23.46 kg/m²  Body mass index is 23.46 kg/m².    Physical Exam  Vitals reviewed.   Constitutional:       General: She is not in acute distress.     Appearance: She is not toxic-appearing.   HENT:      Head: Normocephalic and atraumatic.      Right Ear: External ear normal.      Left Ear: External ear normal.   Eyes:      General:         Right eye: No discharge.         Left eye: No discharge.      Extraocular Movements: Extraocular movements intact.      Conjunctiva/sclera: Conjunctivae normal.   Pulmonary:      Effort: Pulmonary effort is normal. No respiratory distress.   Skin:     General: Skin is warm and dry.   Neurological:      Mental Status: She is alert.   Psychiatric:         Mood and Affect: Mood normal.         Behavior: Behavior normal.         Thought Content: Thought content normal.         Judgment: Judgment normal.       Assessment & Plan:     53 y.o. female with the following -     1. Bronchitis  Likely she has developed a bronchitis as she has had a cough for over a week that is not improving and now has some rattling and congestion in the chest.  Doxycycline sent to pharmacy.  Refill of promethazine and Tessalon given  - doxycycline (VIBRAMYCIN) 100 MG Tab; Take 1 Tablet by mouth 2 times a day for 7 days.  Dispense: 14 Tablet; Refill: 0  - benzonatate (TESSALON) 100 MG Cap; Take 1 Capsule by " mouth 3 times a day as needed for Cough.  Dispense: 60 Capsule; Refill: 0  - promethazine-dextromethorphan (PROMETHAZINE-DM) 6.25-15 MG/5ML syrup; Take 5 mL by mouth every four hours as needed for Cough.  Dispense: 120 mL; Refill: 0      No follow-ups on file.    Please note that this dictation was created using voice recognition software. I have made every reasonable attempt to correct obvious errors, but I expect that there are errors of grammar and possibly content that I did not discover before finalizing the note.

## 2023-09-15 ENCOUNTER — OFFICE VISIT (OUTPATIENT)
Dept: MEDICAL GROUP | Facility: MEDICAL CENTER | Age: 53
End: 2023-09-15

## 2023-09-15 VITALS
HEIGHT: 64 IN | SYSTOLIC BLOOD PRESSURE: 104 MMHG | WEIGHT: 135 LBS | BODY MASS INDEX: 23.05 KG/M2 | OXYGEN SATURATION: 97 % | TEMPERATURE: 97.9 F | DIASTOLIC BLOOD PRESSURE: 62 MMHG | HEART RATE: 72 BPM

## 2023-09-15 DIAGNOSIS — J40 BRONCHITIS: ICD-10-CM

## 2023-09-15 DIAGNOSIS — Z91.09 ENVIRONMENTAL ALLERGIES: ICD-10-CM

## 2023-09-15 PROCEDURE — 3074F SYST BP LT 130 MM HG: CPT | Performed by: STUDENT IN AN ORGANIZED HEALTH CARE EDUCATION/TRAINING PROGRAM

## 2023-09-15 PROCEDURE — 3078F DIAST BP <80 MM HG: CPT | Performed by: STUDENT IN AN ORGANIZED HEALTH CARE EDUCATION/TRAINING PROGRAM

## 2023-09-15 PROCEDURE — 99213 OFFICE O/P EST LOW 20 MIN: CPT | Performed by: STUDENT IN AN ORGANIZED HEALTH CARE EDUCATION/TRAINING PROGRAM

## 2023-09-15 RX ORDER — MOMETASONE FUROATE 50 UG/1
2 SPRAY, METERED NASAL DAILY
Qty: 1 EACH | Refills: 5 | Status: SHIPPED | OUTPATIENT
Start: 2023-09-15 | End: 2023-09-26 | Stop reason: SDUPTHER

## 2023-09-15 RX ORDER — DEXTROMETHORPHAN HYDROBROMIDE AND PROMETHAZINE HYDROCHLORIDE 15; 6.25 MG/5ML; MG/5ML
5 SYRUP ORAL EVERY 4 HOURS PRN
Qty: 120 ML | Refills: 0 | Status: SHIPPED | OUTPATIENT
Start: 2023-09-15 | End: 2023-10-05 | Stop reason: SDUPTHER

## 2023-09-15 RX ORDER — DOXYCYCLINE HYCLATE 100 MG
100 TABLET ORAL
COMMUNITY
Start: 2023-03-21 | End: 2023-10-05

## 2023-09-15 RX ORDER — PREDNISONE 20 MG/1
40 TABLET ORAL DAILY
Qty: 10 TABLET | Refills: 0 | Status: SHIPPED | OUTPATIENT
Start: 2023-09-15 | End: 2023-09-20

## 2023-09-15 ASSESSMENT — FIBROSIS 4 INDEX: FIB4 SCORE: 0.69

## 2023-09-15 NOTE — PROGRESS NOTES
Subjective:     CC: allergy symptoms    HPI:   Uzma presents today for allergy symptoms    Problem   Environmental Allergies    Interval history:  Patient reports worsening of allergy symptoms including sinus congestion, runny nose, coughing for the past 2 weeks. She previously tried combination of prednisone, Nasonex, promethazine-DM which helped. She would like to get refill on medications.    Patient does not have a history of environmental allergies.  For the last 5 days or so she has had itching in the back of her throat, itchy and watery eyes and nasal congestion.  She tried over-the-counter Flonase and Claritin without any relief.  Added azelastine nasal spray and eyedrops 9/30/2022.  Started oral steroids 10/3/2022 with good relief.  As soon as her prednisone ran out her symptoms began returning.  She has stopped taking Claritin and is still using azelastine eyedrops.  She uses mometasone nasal spray as needed with little relief.  10/11/2022 switched to ketotifen eye drops, 5 more days of oral steroid, symptoms 80% improved         Current Outpatient Medications Ordered in Epic   Medication Sig Dispense Refill    doxycycline (VIBRAMYCIN) 100 MG Tab Take 100 mg by mouth.      Phenyleph-Chlorphen-Codeine 10-4-10 MG/5ML Liquid       predniSONE (DELTASONE) 20 MG Tab Take 2 Tablets by mouth every day for 5 days. 10 Tablet 0    mometasone (NASONEX) 50 MCG/ACT nasal spray Administer 2 Sprays into affected nostril(S) every day. 1 Each 5    promethazine-dextromethorphan (PROMETHAZINE-DM) 6.25-15 MG/5ML syrup Take 5 mL by mouth every four hours as needed for Cough. 120 mL 0    benzonatate (TESSALON) 100 MG Cap Take 1 Capsule by mouth 3 times a day as needed for Cough. 60 Capsule 0    ketotifen (ZADITOR) 0.025 % ophthalmic solution Administer 1 Drop into both eyes 2 times a day. 10 mL 0    Levocetirizine Dihydrochloride 5 MG Tab Take 1 Tablet by mouth every day. 30 Tablet 0    estrogen, conjugated,-medroxyprogesterone  "(PREMPRO) 0.3-1.5 MG per tablet Take 1 Tablet by mouth every day. 28 Tablet 5    simvastatin (ZOCOR) 20 MG Tab Take 1 Tablet by mouth every evening. 30 Tablet 11    mometasone (NASONEX) 50 MCG/ACT nasal spray Administer 2 Sprays into affected nostril(S) every day. (Patient not taking: Reported on 3/21/2023) 1 Each 2    pantoprazole (PROTONIX) 40 MG Tablet Delayed Response Take 1 Tablet by mouth every day. 30 Tablet 3     No current Epic-ordered facility-administered medications on file.     ROS:  See HPI    Objective:     Exam:  /62 (BP Location: Left arm, Patient Position: Sitting, BP Cuff Size: Adult)   Pulse 72   Temp 36.6 °C (97.9 °F) (Temporal)   Ht 1.626 m (5' 4\")   Wt 61.2 kg (135 lb)   SpO2 97%   BMI 23.17 kg/m²  Body mass index is 23.17 kg/m².    Gen: Alert and oriented, No apparent distress.  HEENT: Normocephalic. Ear canals are clear bilaterally, tympanic membranes are benign, nasal mucosa benign, oropharynx is without erythema, edema or exudates.  Neck: Neck is supple without lymphadenopathy.  Lungs: Normal effort, CTA bilaterally, no wheezes, rhonchi, or rales  CV: Regular rate and rhythm. No murmurs, rubs, or gallops.    Assessment & Plan:     53 y.o. female with the following -     1. Environmental allergies  Acute on chronic condition, persistent and worsened. Rx Nasonex and prednisone per orders. Advised to trial over the counter nasal saline rinsing daily prior to Nasonex use.  - predniSONE (DELTASONE) 20 MG Tab; Take 2 Tablets by mouth every day for 5 days.  Dispense: 10 Tablet; Refill: 0  - mometasone (NASONEX) 50 MCG/ACT nasal spray; Administer 2 Sprays into affected nostril(S) every day.  Dispense: 1 Each; Refill: 5    2. Bronchitis  Acute on chronic condition, suspect secondary to above. Continue plan as above.  - promethazine-dextromethorphan (PROMETHAZINE-DM) 6.25-15 MG/5ML syrup; Take 5 mL by mouth every four hours as needed for Cough.  Dispense: 120 mL; Refill: 0    Return if " symptoms worsen or fail to improve.    Please note that this dictation was created using voice recognition software. I have made every reasonable attempt to correct obvious errors, but I expect that there are errors of grammar and possibly content that I did not discover before finalizing the note.

## 2023-09-26 DIAGNOSIS — Z91.09 ENVIRONMENTAL ALLERGIES: ICD-10-CM

## 2023-09-26 RX ORDER — LEVOCETIRIZINE DIHYDROCHLORIDE 5 MG/1
1 TABLET, FILM COATED ORAL DAILY
Qty: 90 TABLET | Refills: 3 | Status: SHIPPED | OUTPATIENT
Start: 2023-09-26

## 2023-09-26 RX ORDER — MOMETASONE FUROATE 50 UG/1
2 SPRAY, METERED NASAL DAILY
Qty: 1 EACH | Refills: 5 | Status: SHIPPED | OUTPATIENT
Start: 2023-09-26 | End: 2023-10-05 | Stop reason: SDUPTHER

## 2023-09-26 RX ORDER — KETOTIFEN FUMARATE 0.25 MG/ML
1 SOLUTION/ DROPS OPHTHALMIC 2 TIMES DAILY
Qty: 10 ML | Refills: 0 | Status: SHIPPED | OUTPATIENT
Start: 2023-09-26 | End: 2023-10-05 | Stop reason: SDUPTHER

## 2023-10-05 ENCOUNTER — OFFICE VISIT (OUTPATIENT)
Dept: MEDICAL GROUP | Facility: MEDICAL CENTER | Age: 53
End: 2023-10-05

## 2023-10-05 VITALS
TEMPERATURE: 98.1 F | SYSTOLIC BLOOD PRESSURE: 104 MMHG | BODY MASS INDEX: 23.22 KG/M2 | OXYGEN SATURATION: 100 % | WEIGHT: 136 LBS | DIASTOLIC BLOOD PRESSURE: 58 MMHG | HEIGHT: 64 IN | HEART RATE: 83 BPM | RESPIRATION RATE: 16 BRPM

## 2023-10-05 DIAGNOSIS — Z91.09 ENVIRONMENTAL ALLERGIES: ICD-10-CM

## 2023-10-05 PROCEDURE — 3074F SYST BP LT 130 MM HG: CPT | Performed by: STUDENT IN AN ORGANIZED HEALTH CARE EDUCATION/TRAINING PROGRAM

## 2023-10-05 PROCEDURE — 99214 OFFICE O/P EST MOD 30 MIN: CPT | Performed by: STUDENT IN AN ORGANIZED HEALTH CARE EDUCATION/TRAINING PROGRAM

## 2023-10-05 PROCEDURE — 3078F DIAST BP <80 MM HG: CPT | Performed by: STUDENT IN AN ORGANIZED HEALTH CARE EDUCATION/TRAINING PROGRAM

## 2023-10-05 RX ORDER — MOMETASONE FUROATE 50 UG/1
2 SPRAY, METERED NASAL DAILY
Qty: 1 EACH | Refills: 5 | Status: SHIPPED | OUTPATIENT
Start: 2023-10-05

## 2023-10-05 RX ORDER — DEXTROMETHORPHAN HYDROBROMIDE AND PROMETHAZINE HYDROCHLORIDE 15; 6.25 MG/5ML; MG/5ML
5 SYRUP ORAL EVERY 4 HOURS PRN
Qty: 473 ML | Refills: 0 | Status: SHIPPED | OUTPATIENT
Start: 2023-10-05 | End: 2023-11-30 | Stop reason: SDUPTHER

## 2023-10-05 RX ORDER — KETOTIFEN FUMARATE 0.25 MG/ML
1 SOLUTION/ DROPS OPHTHALMIC 2 TIMES DAILY
Qty: 10 ML | Refills: 0 | Status: SHIPPED | OUTPATIENT
Start: 2023-10-05

## 2023-10-05 RX ORDER — METHYLPREDNISOLONE 4 MG/1
TABLET ORAL
Qty: 21 TABLET | Refills: 0 | Status: SHIPPED | OUTPATIENT
Start: 2023-10-05 | End: 2024-03-21

## 2023-10-05 ASSESSMENT — FIBROSIS 4 INDEX: FIB4 SCORE: 0.69

## 2023-10-05 NOTE — PROGRESS NOTES
"Subjective:     CC: Environmental allergies    HPI:   Uzma presents today with    Problem   Environmental Allergies    This is a chronic condition.  Patient has severe seasonal allergies.  These have previously been relieved with Nasonex, ketotifen eyedrops, and Xyzal.  She was able to get a refill of her Xyzal but she is still having issues with her eyes and nasal congestion.  She is going to be flying to [facility] and wants to make sure that she is okay on the plane.  She also has used promethazine cough syrup in the past with good relief for her cough that she gets during her allergies.         ROS:  ROS    Objective:     Exam:  /58   Pulse 83   Temp 36.7 °C (98.1 °F) (Temporal)   Resp 16   Ht 1.626 m (5' 4\")   Wt 61.7 kg (136 lb)   SpO2 100%   BMI 23.34 kg/m²  Body mass index is 23.34 kg/m².    Physical Exam  Vitals reviewed.   Constitutional:       General: She is not in acute distress.     Appearance: She is not toxic-appearing.      Comments: Exam through observation only   HENT:      Head: Normocephalic and atraumatic.      Right Ear: External ear normal.      Left Ear: External ear normal.   Eyes:      General:         Right eye: No discharge.         Left eye: No discharge.      Extraocular Movements: Extraocular movements intact.      Conjunctiva/sclera: Conjunctivae normal.   Pulmonary:      Effort: Pulmonary effort is normal. No respiratory distress.   Skin:     General: Skin is warm and dry.   Neurological:      Mental Status: She is alert.   Psychiatric:         Mood and Affect: Mood normal.         Behavior: Behavior normal.         Thought Content: Thought content normal.         Judgment: Judgment normal.           Assessment & Plan:     53 y.o. female with the following -     1. Environmental allergies  I refilled all of her allergy medications.  I also gave her a Medrol Dosepak that she can keep on hand in case she has a bad flareup while she is traveling.  She will follow-up when " she is back  - ketotifen (ZADITOR) 0.025 % ophthalmic solution; Administer 1 Drop into both eyes 2 times a day.  Dispense: 10 mL; Refill: 0  - mometasone (NASONEX) 50 MCG/ACT nasal spray; Administer 2 Sprays into affected nostril(S) every day.  Dispense: 1 Each; Refill: 5  - promethazine-dextromethorphan (PROMETHAZINE-DM) 6.25-15 MG/5ML syrup; Take 5 mL by mouth every four hours as needed for Cough.  Dispense: 473 mL; Refill: 0  - methylPREDNISolone (MEDROL DOSEPAK) 4 MG Tablet Therapy Pack; As directed on the packaging label.  Dispense: 21 Tablet; Refill: 0    No follow-ups on file.    Please note that this dictation was created using voice recognition software. I have made every reasonable attempt to correct obvious errors, but I expect that there are errors of grammar and possibly content that I did not discover before finalizing the note.

## 2023-11-17 ENCOUNTER — APPOINTMENT (OUTPATIENT)
Dept: MEDICAL GROUP | Facility: MEDICAL CENTER | Age: 53
End: 2023-11-17

## 2023-11-22 DIAGNOSIS — N95.1 MENOPAUSAL SYNDROME ON HORMONE REPLACEMENT THERAPY: ICD-10-CM

## 2023-11-22 DIAGNOSIS — Z79.890 MENOPAUSAL SYNDROME ON HORMONE REPLACEMENT THERAPY: ICD-10-CM

## 2023-11-22 RX ORDER — ESTROGEN,CON/M-PROGEST ACET 0.3-1.5MG
1 TABLET ORAL DAILY
Qty: 28 TABLET | Refills: 5 | Status: SHIPPED | OUTPATIENT
Start: 2023-11-22 | End: 2023-11-30 | Stop reason: SDUPTHER

## 2023-11-30 ENCOUNTER — OFFICE VISIT (OUTPATIENT)
Dept: MEDICAL GROUP | Facility: MEDICAL CENTER | Age: 53
End: 2023-11-30

## 2023-11-30 VITALS
WEIGHT: 140 LBS | HEIGHT: 64 IN | SYSTOLIC BLOOD PRESSURE: 106 MMHG | BODY MASS INDEX: 23.9 KG/M2 | RESPIRATION RATE: 16 BRPM | HEART RATE: 76 BPM | DIASTOLIC BLOOD PRESSURE: 64 MMHG | OXYGEN SATURATION: 99 % | TEMPERATURE: 97 F

## 2023-11-30 DIAGNOSIS — N95.1 MENOPAUSAL SYNDROME ON HORMONE REPLACEMENT THERAPY: ICD-10-CM

## 2023-11-30 DIAGNOSIS — B00.1 COLD SORE: ICD-10-CM

## 2023-11-30 DIAGNOSIS — R05.2 SUBACUTE COUGH: ICD-10-CM

## 2023-11-30 DIAGNOSIS — F41.9 ANXIETY: ICD-10-CM

## 2023-11-30 DIAGNOSIS — Z79.890 MENOPAUSAL SYNDROME ON HORMONE REPLACEMENT THERAPY: ICD-10-CM

## 2023-11-30 DIAGNOSIS — E78.00 PURE HYPERCHOLESTEROLEMIA: ICD-10-CM

## 2023-11-30 DIAGNOSIS — K21.9 GASTROESOPHAGEAL REFLUX DISEASE WITHOUT ESOPHAGITIS: ICD-10-CM

## 2023-11-30 PROCEDURE — 3074F SYST BP LT 130 MM HG: CPT | Performed by: STUDENT IN AN ORGANIZED HEALTH CARE EDUCATION/TRAINING PROGRAM

## 2023-11-30 PROCEDURE — 99214 OFFICE O/P EST MOD 30 MIN: CPT | Performed by: STUDENT IN AN ORGANIZED HEALTH CARE EDUCATION/TRAINING PROGRAM

## 2023-11-30 PROCEDURE — 3078F DIAST BP <80 MM HG: CPT | Performed by: STUDENT IN AN ORGANIZED HEALTH CARE EDUCATION/TRAINING PROGRAM

## 2023-11-30 RX ORDER — HYDROXYZINE HYDROCHLORIDE 25 MG/1
25 TABLET, FILM COATED ORAL NIGHTLY PRN
Qty: 90 TABLET | Refills: 0 | Status: SHIPPED | OUTPATIENT
Start: 2023-11-30

## 2023-11-30 RX ORDER — VALACYCLOVIR HYDROCHLORIDE 1 G/1
1000 TABLET, FILM COATED ORAL 2 TIMES DAILY
Qty: 40 TABLET | Refills: 0 | Status: SHIPPED | OUTPATIENT
Start: 2023-11-30 | End: 2023-11-30 | Stop reason: SDUPTHER

## 2023-11-30 RX ORDER — PANTOPRAZOLE SODIUM 40 MG/1
40 TABLET, DELAYED RELEASE ORAL DAILY
Qty: 90 TABLET | Refills: 3 | Status: SHIPPED | OUTPATIENT
Start: 2023-11-30 | End: 2024-01-22 | Stop reason: SDUPTHER

## 2023-11-30 RX ORDER — ESTROGEN,CON/M-PROGEST ACET 0.3-1.5MG
1 TABLET ORAL DAILY
Qty: 84 TABLET | Refills: 3 | Status: SHIPPED | OUTPATIENT
Start: 2023-11-30

## 2023-11-30 RX ORDER — VALACYCLOVIR HYDROCHLORIDE 1 G/1
1000 TABLET, FILM COATED ORAL 2 TIMES DAILY
Qty: 40 TABLET | Refills: 0 | Status: SHIPPED | OUTPATIENT
Start: 2023-11-30

## 2023-11-30 RX ORDER — DEXTROMETHORPHAN HYDROBROMIDE AND PROMETHAZINE HYDROCHLORIDE 15; 6.25 MG/5ML; MG/5ML
5 SYRUP ORAL EVERY 4 HOURS PRN
Qty: 473 ML | Refills: 0 | Status: SHIPPED | OUTPATIENT
Start: 2023-11-30

## 2023-11-30 ASSESSMENT — FIBROSIS 4 INDEX: FIB4 SCORE: 0.69

## 2023-11-30 NOTE — PROGRESS NOTES
Subjective:     CC: Cough, cold sore, GERD, anxiety, cholesterol    HPI:   Uzma presents today with    Problem   Subacute Cough    This is a chronic condition, secondary to allergies and GERD.  Promethazine and dextromethorphan cough syrup works well for her     Cold Sore    Recurrent condition, happens about 4 times a year, currently active     Gastroesophageal Reflux Disease Without Esophagitis    This is a chronic condition.  She was on pantoprazole 40 mg daily but got off of it several weeks ago.  Since then she has been noting a worsening cough and a burning sensation in the epigastric region and chest.     Anxiety    This is a new condition.  She has been having a difficult time sleeping due to anxiety from losing several family members in the last month.     Pure Hypercholesterolemia    Chronic condition, currently on simvastatin 20 mg daily, no recent labs due to a Insurance    Lab Results   Component Value Date/Time    CHOLSTRLTOT 249 (H) 10/06/2022 07:15 AM     (H) 10/06/2022 07:15 AM    HDL 95 10/06/2022 07:15 AM    TRIGLYCERIDE 81 10/06/2022 07:15 AM       Lab Results   Component Value Date/Time    SODIUM 143 10/06/2022 07:15 AM    POTASSIUM 3.5 (L) 10/06/2022 07:15 AM    CHLORIDE 107 10/06/2022 07:15 AM    CO2 25 10/06/2022 07:15 AM    GLUCOSE 94 10/06/2022 07:15 AM    BUN 21 10/06/2022 07:15 AM    CREATININE 0.60 10/06/2022 07:15 AM     Lab Results   Component Value Date/Time    ALKPHOSPHAT 50 10/06/2022 07:15 AM    ASTSGOT 14 10/06/2022 07:15 AM    ALTSGPT 14 10/06/2022 07:15 AM    TBILIRUBIN 0.5 10/06/2022 07:15 AM           Menopausal Syndrome On Hormone Replacement Therapy    9/23/21:  Gyn has given prempro 0.625/2.5mg. patient stopped because of left breast tenderness  Then given Estradiol trandermal 0.025mg 2x weekly. Has not been taking for the last few months  2/1/22: Taking Prempro 0.3-1.5 mg/tab.  9/30/22: Prempro 0.3-1.5 mg per tab was previously working, although in the last month or  "so she has had continued hot flashes.  ASCVD risk is less than 5%, still appropriate for oral hormonal therapy replacement.    Doing well on current dosing         ROS:  ROS    Objective:     Exam:  /64   Pulse 76   Temp 36.1 °C (97 °F) (Temporal)   Resp 16   Ht 1.626 m (5' 4\")   Wt 63.5 kg (140 lb)   SpO2 99%   BMI 24.03 kg/m²  Body mass index is 24.03 kg/m².    Physical Exam  Vitals reviewed.   Constitutional:       General: She is not in acute distress.     Appearance: She is not toxic-appearing.   HENT:      Head: Normocephalic and atraumatic.      Right Ear: External ear normal.      Left Ear: External ear normal.      Mouth/Throat:      Comments: Cold sore on the left side lower lip  Eyes:      General:         Right eye: No discharge.         Left eye: No discharge.      Extraocular Movements: Extraocular movements intact.      Conjunctiva/sclera: Conjunctivae normal.   Pulmonary:      Effort: Pulmonary effort is normal. No respiratory distress.   Skin:     General: Skin is warm and dry.   Neurological:      Mental Status: She is alert.   Psychiatric:         Mood and Affect: Mood normal.         Behavior: Behavior normal.         Thought Content: Thought content normal.         Judgment: Judgment normal.           Assessment & Plan:     53 y.o. female with the following -     1. Pure hypercholesterolemia  Continue statin at current dosing, labs will be obtained in the new year when she has insurance    2. Gastroesophageal reflux disease without esophagitis  Restart pantoprazole as her symptoms of GERD have worsened since discontinuing it  - pantoprazole (PROTONIX) 40 MG Tablet Delayed Response; Take 1 Tablet by mouth every day.  Dispense: 90 Tablet; Refill: 3    3. Menopausal syndrome on hormone replacement therapy  Continue hormone replacement therapy  - estrogen, conjugated,-medroxyprogesterone (PREMPRO) 0.3-1.5 MG per tablet; Take 1 Tablet by mouth every day.  Dispense: 84 Tablet; Refill: " 3    4. Cold sore  Valtrex given for cold sore  - valacyclovir (VALTREX) 1 GM Tab; Take 1 Tablet by mouth 2 times a day. Take for 3-5 days at the first sign of cold sore  Dispense: 40 Tablet; Refill: 0    5. Subacute cough  Continue cough syrup as needed.  We discussed the risks previously  - promethazine-dextromethorphan (PROMETHAZINE-DM) 6.25-15 MG/5ML syrup; Take 5 mL by mouth every four hours as needed for Cough.  Dispense: 473 mL; Refill: 0    6. Anxiety  Acute condition, discussed taking Atarax as needed nightly to help with sleep and anxiety.  - hydrOXYzine HCl (ATARAX) 25 MG Tab; Take 1 Tablet by mouth at bedtime as needed for Anxiety.  Dispense: 90 Tablet; Refill: 0    No follow-ups on file.    Please note that this dictation was created using voice recognition software. I have made every reasonable attempt to correct obvious errors, but I expect that there are errors of grammar and possibly content that I did not discover before finalizing the note.

## 2024-01-22 ENCOUNTER — OFFICE VISIT (OUTPATIENT)
Dept: MEDICAL GROUP | Facility: MEDICAL CENTER | Age: 54
End: 2024-01-22
Payer: COMMERCIAL

## 2024-01-22 VITALS
OXYGEN SATURATION: 100 % | RESPIRATION RATE: 16 BRPM | BODY MASS INDEX: 24.07 KG/M2 | DIASTOLIC BLOOD PRESSURE: 60 MMHG | WEIGHT: 141 LBS | TEMPERATURE: 97 F | HEART RATE: 86 BPM | SYSTOLIC BLOOD PRESSURE: 112 MMHG | HEIGHT: 64 IN

## 2024-01-22 DIAGNOSIS — Z13.1 SCREENING FOR DIABETES MELLITUS: ICD-10-CM

## 2024-01-22 DIAGNOSIS — Z12.31 ENCOUNTER FOR SCREENING MAMMOGRAM FOR MALIGNANT NEOPLASM OF BREAST: ICD-10-CM

## 2024-01-22 DIAGNOSIS — Z23 IMMUNIZATION DUE: ICD-10-CM

## 2024-01-22 DIAGNOSIS — Z12.4 SCREENING FOR MALIGNANT NEOPLASM OF CERVIX: ICD-10-CM

## 2024-01-22 DIAGNOSIS — Z11.59 NEED FOR HEPATITIS C SCREENING TEST: ICD-10-CM

## 2024-01-22 DIAGNOSIS — K21.9 GASTROESOPHAGEAL REFLUX DISEASE WITHOUT ESOPHAGITIS: ICD-10-CM

## 2024-01-22 DIAGNOSIS — E78.00 PURE HYPERCHOLESTEROLEMIA: ICD-10-CM

## 2024-01-22 DIAGNOSIS — R05.2 SUBACUTE COUGH: ICD-10-CM

## 2024-01-22 DIAGNOSIS — Z11.4 ENCOUNTER FOR SCREENING FOR HIV: ICD-10-CM

## 2024-01-22 DIAGNOSIS — Z00.00 ANNUAL PHYSICAL EXAM: ICD-10-CM

## 2024-01-22 DIAGNOSIS — Z13.0 SCREENING, ANEMIA, DEFICIENCY, IRON: ICD-10-CM

## 2024-01-22 PROCEDURE — 99396 PREV VISIT EST AGE 40-64: CPT | Performed by: STUDENT IN AN ORGANIZED HEALTH CARE EDUCATION/TRAINING PROGRAM

## 2024-01-22 PROCEDURE — 3078F DIAST BP <80 MM HG: CPT | Performed by: STUDENT IN AN ORGANIZED HEALTH CARE EDUCATION/TRAINING PROGRAM

## 2024-01-22 PROCEDURE — 3074F SYST BP LT 130 MM HG: CPT | Performed by: STUDENT IN AN ORGANIZED HEALTH CARE EDUCATION/TRAINING PROGRAM

## 2024-01-22 PROCEDURE — 99214 OFFICE O/P EST MOD 30 MIN: CPT | Mod: 25 | Performed by: STUDENT IN AN ORGANIZED HEALTH CARE EDUCATION/TRAINING PROGRAM

## 2024-01-22 RX ORDER — FAMOTIDINE 20 MG/1
20 TABLET, FILM COATED ORAL 2 TIMES DAILY
Qty: 180 TABLET | Refills: 0 | Status: SHIPPED | OUTPATIENT
Start: 2024-01-22

## 2024-01-22 RX ORDER — PANTOPRAZOLE SODIUM 40 MG/1
40 TABLET, DELAYED RELEASE ORAL DAILY
Qty: 90 TABLET | Refills: 3 | Status: SHIPPED | OUTPATIENT
Start: 2024-01-22

## 2024-01-22 ASSESSMENT — FIBROSIS 4 INDEX: FIB4 SCORE: 0.69

## 2024-01-22 NOTE — PROGRESS NOTES
Subjective:     CC:   Chief Complaint   Patient presents with    Annual Exam    Cough     1 MONTH        HPI:   Uzma Anna is a 53 y.o. female who presents for annual exam and to discuss cough    Problem   Subacute Cough    This is a chronic condition, secondary to allergies and GERD.  She is currently taking Xyzal and Protonix.  Promethazine and dextromethorphan cough syrup works well for her, but she still has a dry cough especially at night.  Hoping for something else to help.       Ob-Gyn/ History:    Patient has GYN provider: no  Last Pap Smear:  Unsure, but stated she had one about 5 years ago. No history of abnormal pap smears.    Health Maintenance  Cholesterol Screening: Ordered   Diabetes Screening: Ordered   Aspirin Use: NA    Substance Abuse: NA     Cancer screening  Colorectal Cancer Screening: Up to date    Lung Cancer Screening: NA    Cervical Cancer Screening: Discussed, referral sent   Breast Cancer Screening: Ordered     Infectious disease screening/Immunizations  --HIV Screening: Ordered   --Hepatitis C Screening: Ordered   --Immunizations: Due for hepatitis B vaccine, Tdap vaccine, and flu vaccine, declines all today    She  has a past medical history of Cold sore (11/30/2023), Major depressive disorder (9/23/2021), and Major depressive disorder (9/23/2021).    She has no past medical history of ASTHMA, CAD (coronary artery disease), Cancer (HCC), Congestive heart failure (HCC), COPD, Diabetes, Hypertension, Liver disease, Renal disorder, Seizure disorder (HCC), or Stroke (HCC).  She  has no past surgical history on file.    No family history on file.    Social History     Socioeconomic History    Marital status:      Spouse name: Not on file    Number of children: Not on file    Years of education: Not on file    Highest education level: Not on file   Occupational History    Not on file   Tobacco Use    Smoking status: Never    Smokeless tobacco: Never   Vaping Use    Vaping  Use: Never used   Substance and Sexual Activity    Alcohol use: No    Drug use: No    Sexual activity: Not on file   Other Topics Concern    Not on file   Social History Narrative    Not on file     Social Determinants of Health     Financial Resource Strain: Not on file   Food Insecurity: Not on file   Transportation Needs: Not on file   Physical Activity: Not on file   Stress: Not on file   Social Connections: Not on file   Intimate Partner Violence: Not on file   Housing Stability: Not on file       Patient Active Problem List    Diagnosis Date Noted    Subacute cough 11/30/2023    Cold sore 11/30/2023    Gastroesophageal reflux disease without esophagitis 11/30/2023    Anxiety 11/30/2023    Pure hypercholesterolemia 10/03/2022    Environmental allergies 09/30/2022    Menopausal syndrome on hormone replacement therapy 09/23/2021         Current Outpatient Medications   Medication Sig Dispense Refill    pantoprazole (PROTONIX) 40 MG Tablet Delayed Response Take 1 Tablet by mouth every day. 90 Tablet 3    famotidine (PEPCID) 20 MG Tab Take 1 Tablet by mouth 2 times a day. 180 Tablet 0    estrogen, conjugated,-medroxyprogesterone (PREMPRO) 0.3-1.5 MG per tablet Take 1 Tablet by mouth every day. 84 Tablet 3    promethazine-dextromethorphan (PROMETHAZINE-DM) 6.25-15 MG/5ML syrup Take 5 mL by mouth every four hours as needed for Cough. 473 mL 0    hydrOXYzine HCl (ATARAX) 25 MG Tab Take 1 Tablet by mouth at bedtime as needed for Anxiety. 90 Tablet 0    valacyclovir (VALTREX) 1 GM Tab Take 1 Tablet by mouth 2 times a day. Take for 3-5 days at the first sign of cold sore 40 Tablet 0    ketotifen (ZADITOR) 0.025 % ophthalmic solution Administer 1 Drop into both eyes 2 times a day. 10 mL 0    mometasone (NASONEX) 50 MCG/ACT nasal spray Administer 2 Sprays into affected nostril(S) every day. 1 Each 5    methylPREDNISolone (MEDROL DOSEPAK) 4 MG Tablet Therapy Pack As directed on the packaging label. 21 Tablet 0     "Levocetirizine Dihydrochloride 5 MG Tab Take 1 Tablet by mouth every day. 90 Tablet 3    benzonatate (TESSALON) 100 MG Cap Take 1 Capsule by mouth 3 times a day as needed for Cough. 60 Capsule 0    simvastatin (ZOCOR) 20 MG Tab Take 1 Tablet by mouth every evening. 30 Tablet 11    mometasone (NASONEX) 50 MCG/ACT nasal spray Administer 2 Sprays into affected nostril(S) every day. (Patient not taking: Reported on 3/21/2023) 1 Each 2     No current facility-administered medications for this visit.     No Known Allergies      Objective:     /60   Pulse 86   Temp 36.1 °C (97 °F) (Temporal)   Resp 16   Ht 1.626 m (5' 4\")   Wt 64 kg (141 lb)   SpO2 100%   BMI 24.20 kg/m²   Body mass index is 24.2 kg/m².  Wt Readings from Last 4 Encounters:   01/22/24 64 kg (141 lb)   11/30/23 63.5 kg (140 lb)   10/05/23 61.7 kg (136 lb)   09/15/23 61.2 kg (135 lb)       Physical Exam:  Constitutional: Well-developed and well-nourished. Not diaphoretic. No distress.   Skin: Skin is warm and dry. No rash noted.  Head: Atraumatic without lesions.  Eyes: Conjunctivae and extraocular motions are normal. Pupils are equal, round, and reactive to light. No scleral icterus.   Ears:  External ears unremarkable. Tympanic membranes clear and intact.  Nose: Nares patent. Septum midline. Turbinates without erythema nor edema. No discharge.   Mouth/Throat: Dentition is normal. Tongue normal. Oropharynx is clear and moist. Posterior pharynx without erythema or exudates.  Neck: Supple, trachea midline. Normal range of motion.   Cardiovascular: Regular rate and rhythm, S1 and S2 without murmur, rubs, or gallops.  Lungs: Normal inspiratory effort, CTA bilaterally, no wheezes/rhonchi/rales  Abdomen: Soft, non tender, and without distention. Active bowel sounds in all four quadrants. No rebound, guarding, masses or HSM.  Extremities: No cyanosis, clubbing, erythema, nor edema.   Musculoskeletal: All major joints AROM full in all directions without " pain.  Neurological: Alert and oriented x 3 No cranial nerve deficit.   Psychiatric:  Behavior, mood, and affect are appropriate.    Assessment and Plan:     1. Annual physical exam  Discussed routine screening and vaccination, patient declines vaccines today.  Follow-up in 1 year for annual.    2. Subacute cough  Discussed that this is likely secondary to allergies versus GERD or combination.  Start Pepcid  - famotidine (PEPCID) 20 MG Tab; Take 1 Tablet by mouth 2 times a day.  Dispense: 180 Tablet; Refill: 0    3. Gastroesophageal reflux disease without esophagitis  Refill given  - pantoprazole (PROTONIX) 40 MG Tablet Delayed Response; Take 1 Tablet by mouth every day.  Dispense: 90 Tablet; Refill: 3    4. Pure hypercholesterolemia  Chronic, continue simvastatin at current dosing, recheck labs  - Lipid Profile; Future  - Comp Metabolic Panel; Future    5. Need for hepatitis C screening test  - HIV AG/AB COMBO ASSAY SCREENING; Future    6. Encounter for screening for HIV  - HEP C VIRUS ANTIBODY; Future    7. Encounter for screening mammogram for malignant neoplasm of breast  - MA-SCREENING MAMMO BILAT W/TOMOSYNTHESIS W/CAD; Future    8. Screening, anemia, deficiency, iron  - CBC WITH DIFFERENTIAL; Future    9. Screening for diabetes mellitus  - HEMOGLOBIN A1C; Future    10. Immunization due  Declines    11. Screening for malignant neoplasm of cervix  - Referral to OB/Gyn    Please note that this dictation was created using voice recognition software. I have made every reasonable attempt to correct obvious errors, but I expect that there are errors of grammar and possibly content that I did not discover before finalizing the note.      Follow-up: No follow-ups on file.

## 2024-01-25 ENCOUNTER — APPOINTMENT (OUTPATIENT)
Dept: MEDICAL GROUP | Facility: MEDICAL CENTER | Age: 54
End: 2024-01-25
Payer: COMMERCIAL

## 2024-02-01 ENCOUNTER — HOSPITAL ENCOUNTER (OUTPATIENT)
Dept: RADIOLOGY | Facility: MEDICAL CENTER | Age: 54
End: 2024-02-01
Attending: STUDENT IN AN ORGANIZED HEALTH CARE EDUCATION/TRAINING PROGRAM
Payer: COMMERCIAL

## 2024-02-01 DIAGNOSIS — Z12.31 ENCOUNTER FOR SCREENING MAMMOGRAM FOR MALIGNANT NEOPLASM OF BREAST: ICD-10-CM

## 2024-02-01 PROCEDURE — 77067 SCR MAMMO BI INCL CAD: CPT

## 2024-03-09 SDOH — ECONOMIC STABILITY: HOUSING INSECURITY

## 2024-03-09 SDOH — ECONOMIC STABILITY: TRANSPORTATION INSECURITY
IN THE PAST 12 MONTHS, HAS LACK OF TRANSPORTATION KEPT YOU FROM MEETINGS, WORK, OR FROM GETTING THINGS NEEDED FOR DAILY LIVING?: PATIENT DECLINED

## 2024-03-09 SDOH — ECONOMIC STABILITY: HOUSING INSECURITY
IN THE LAST 12 MONTHS, WAS THERE A TIME WHEN YOU DID NOT HAVE A STEADY PLACE TO SLEEP OR SLEPT IN A SHELTER (INCLUDING NOW)?: PATIENT DECLINED

## 2024-03-09 SDOH — ECONOMIC STABILITY: TRANSPORTATION INSECURITY
IN THE PAST 12 MONTHS, HAS LACK OF RELIABLE TRANSPORTATION KEPT YOU FROM MEDICAL APPOINTMENTS, MEETINGS, WORK OR FROM GETTING THINGS NEEDED FOR DAILY LIVING?: PATIENT DECLINED

## 2024-03-09 SDOH — ECONOMIC STABILITY: TRANSPORTATION INSECURITY

## 2024-03-09 SDOH — HEALTH STABILITY: MENTAL HEALTH

## 2024-03-09 SDOH — HEALTH STABILITY: PHYSICAL HEALTH

## 2024-03-09 ASSESSMENT — SOCIAL DETERMINANTS OF HEALTH (SDOH)
HOW OFTEN DO YOU ATTENT MEETINGS OF THE CLUB OR ORGANIZATION YOU BELONG TO?: PATIENT DECLINED
DO YOU BELONG TO ANY CLUBS OR ORGANIZATIONS SUCH AS CHURCH GROUPS UNIONS, FRATERNAL OR ATHLETIC GROUPS, OR SCHOOL GROUPS?: PATIENT DECLINED
DO YOU BELONG TO ANY CLUBS OR ORGANIZATIONS SUCH AS CHURCH GROUPS UNIONS, FRATERNAL OR ATHLETIC GROUPS, OR SCHOOL GROUPS?: PATIENT DECLINED
HOW OFTEN DO YOU ATTENT MEETINGS OF THE CLUB OR ORGANIZATION YOU BELONG TO?: PATIENT DECLINED

## 2024-03-12 ENCOUNTER — APPOINTMENT (OUTPATIENT)
Dept: MEDICAL GROUP | Facility: MEDICAL CENTER | Age: 54
End: 2024-03-12
Payer: COMMERCIAL

## 2024-03-19 ENCOUNTER — HOSPITAL ENCOUNTER (OUTPATIENT)
Dept: LAB | Facility: MEDICAL CENTER | Age: 54
End: 2024-03-19
Attending: STUDENT IN AN ORGANIZED HEALTH CARE EDUCATION/TRAINING PROGRAM
Payer: COMMERCIAL

## 2024-03-19 DIAGNOSIS — Z13.1 SCREENING FOR DIABETES MELLITUS: ICD-10-CM

## 2024-03-19 DIAGNOSIS — Z11.59 NEED FOR HEPATITIS C SCREENING TEST: ICD-10-CM

## 2024-03-19 DIAGNOSIS — E78.00 PURE HYPERCHOLESTEROLEMIA: ICD-10-CM

## 2024-03-19 DIAGNOSIS — Z13.0 SCREENING, ANEMIA, DEFICIENCY, IRON: ICD-10-CM

## 2024-03-19 DIAGNOSIS — Z11.4 ENCOUNTER FOR SCREENING FOR HIV: ICD-10-CM

## 2024-03-19 LAB
ALBUMIN SERPL BCP-MCNC: 4.8 G/DL (ref 3.2–4.9)
ALBUMIN/GLOB SERPL: 1.5 G/DL
ALP SERPL-CCNC: 64 U/L (ref 30–99)
ALT SERPL-CCNC: 12 U/L (ref 2–50)
ANION GAP SERPL CALC-SCNC: 13 MMOL/L (ref 7–16)
AST SERPL-CCNC: 16 U/L (ref 12–45)
BASOPHILS # BLD AUTO: 0.7 % (ref 0–1.8)
BASOPHILS # BLD: 0.03 K/UL (ref 0–0.12)
BILIRUB SERPL-MCNC: 1 MG/DL (ref 0.1–1.5)
BUN SERPL-MCNC: 13 MG/DL (ref 8–22)
CALCIUM ALBUM COR SERPL-MCNC: 9.2 MG/DL (ref 8.5–10.5)
CALCIUM SERPL-MCNC: 9.8 MG/DL (ref 8.4–10.2)
CHLORIDE SERPL-SCNC: 103 MMOL/L (ref 96–112)
CHOLEST SERPL-MCNC: 234 MG/DL (ref 100–199)
CO2 SERPL-SCNC: 25 MMOL/L (ref 20–33)
CREAT SERPL-MCNC: 0.65 MG/DL (ref 0.5–1.4)
EOSINOPHIL # BLD AUTO: 0.32 K/UL (ref 0–0.51)
EOSINOPHIL NFR BLD: 7 % (ref 0–6.9)
ERYTHROCYTE [DISTWIDTH] IN BLOOD BY AUTOMATED COUNT: 41.7 FL (ref 35.9–50)
EST. AVERAGE GLUCOSE BLD GHB EST-MCNC: 108 MG/DL
FASTING STATUS PATIENT QL REPORTED: NORMAL
GFR SERPLBLD CREATININE-BSD FMLA CKD-EPI: 103 ML/MIN/1.73 M 2
GLOBULIN SER CALC-MCNC: 3.1 G/DL (ref 1.9–3.5)
GLUCOSE SERPL-MCNC: 98 MG/DL (ref 65–99)
HBA1C MFR BLD: 5.4 % (ref 4–5.6)
HCT VFR BLD AUTO: 45 % (ref 37–47)
HCV AB SER QL: NORMAL
HDLC SERPL-MCNC: 74 MG/DL
HGB BLD-MCNC: 15.1 G/DL (ref 12–16)
HIV 1+2 AB+HIV1 P24 AG SERPL QL IA: NORMAL
IMM GRANULOCYTES # BLD AUTO: 0.01 K/UL (ref 0–0.11)
IMM GRANULOCYTES NFR BLD AUTO: 0.2 % (ref 0–0.9)
LDLC SERPL CALC-MCNC: 147 MG/DL
LYMPHOCYTES # BLD AUTO: 2.18 K/UL (ref 1–4.8)
LYMPHOCYTES NFR BLD: 47.9 % (ref 22–41)
MCH RBC QN AUTO: 30 PG (ref 27–33)
MCHC RBC AUTO-ENTMCNC: 33.6 G/DL (ref 32.2–35.5)
MCV RBC AUTO: 89.5 FL (ref 81.4–97.8)
MONOCYTES # BLD AUTO: 0.35 K/UL (ref 0–0.85)
MONOCYTES NFR BLD AUTO: 7.7 % (ref 0–13.4)
NEUTROPHILS # BLD AUTO: 1.66 K/UL (ref 1.82–7.42)
NEUTROPHILS NFR BLD: 36.5 % (ref 44–72)
NRBC # BLD AUTO: 0 K/UL
NRBC BLD-RTO: 0 /100 WBC (ref 0–0.2)
PLATELET # BLD AUTO: 305 K/UL (ref 164–446)
PMV BLD AUTO: 11 FL (ref 9–12.9)
POTASSIUM SERPL-SCNC: 3.9 MMOL/L (ref 3.6–5.5)
PROT SERPL-MCNC: 7.9 G/DL (ref 6–8.2)
RBC # BLD AUTO: 5.03 M/UL (ref 4.2–5.4)
SODIUM SERPL-SCNC: 141 MMOL/L (ref 135–145)
TRIGL SERPL-MCNC: 67 MG/DL (ref 0–149)
WBC # BLD AUTO: 4.6 K/UL (ref 4.8–10.8)

## 2024-03-19 PROCEDURE — 86803 HEPATITIS C AB TEST: CPT

## 2024-03-19 PROCEDURE — 80053 COMPREHEN METABOLIC PANEL: CPT

## 2024-03-19 PROCEDURE — 80061 LIPID PANEL: CPT

## 2024-03-19 PROCEDURE — 85025 COMPLETE CBC W/AUTO DIFF WBC: CPT

## 2024-03-19 PROCEDURE — 87389 HIV-1 AG W/HIV-1&-2 AB AG IA: CPT

## 2024-03-19 PROCEDURE — 36415 COLL VENOUS BLD VENIPUNCTURE: CPT

## 2024-03-19 PROCEDURE — 83036 HEMOGLOBIN GLYCOSYLATED A1C: CPT

## 2024-03-21 ENCOUNTER — OFFICE VISIT (OUTPATIENT)
Dept: MEDICAL GROUP | Facility: MEDICAL CENTER | Age: 54
End: 2024-03-21
Payer: COMMERCIAL

## 2024-03-21 VITALS
HEART RATE: 63 BPM | WEIGHT: 144.6 LBS | DIASTOLIC BLOOD PRESSURE: 64 MMHG | SYSTOLIC BLOOD PRESSURE: 130 MMHG | BODY MASS INDEX: 24.69 KG/M2 | OXYGEN SATURATION: 100 % | RESPIRATION RATE: 16 BRPM | TEMPERATURE: 98.3 F | HEIGHT: 64 IN

## 2024-03-21 DIAGNOSIS — M62.838 MUSCLE SPASM: ICD-10-CM

## 2024-03-21 DIAGNOSIS — E78.00 PURE HYPERCHOLESTEROLEMIA: ICD-10-CM

## 2024-03-21 DIAGNOSIS — Z23 IMMUNIZATION DUE: ICD-10-CM

## 2024-03-21 PROCEDURE — 90715 TDAP VACCINE 7 YRS/> IM: CPT | Performed by: STUDENT IN AN ORGANIZED HEALTH CARE EDUCATION/TRAINING PROGRAM

## 2024-03-21 PROCEDURE — 3078F DIAST BP <80 MM HG: CPT | Performed by: STUDENT IN AN ORGANIZED HEALTH CARE EDUCATION/TRAINING PROGRAM

## 2024-03-21 PROCEDURE — 3075F SYST BP GE 130 - 139MM HG: CPT | Performed by: STUDENT IN AN ORGANIZED HEALTH CARE EDUCATION/TRAINING PROGRAM

## 2024-03-21 PROCEDURE — 90471 IMMUNIZATION ADMIN: CPT | Performed by: STUDENT IN AN ORGANIZED HEALTH CARE EDUCATION/TRAINING PROGRAM

## 2024-03-21 PROCEDURE — 99214 OFFICE O/P EST MOD 30 MIN: CPT | Mod: 25 | Performed by: STUDENT IN AN ORGANIZED HEALTH CARE EDUCATION/TRAINING PROGRAM

## 2024-03-21 RX ORDER — CYCLOBENZAPRINE HCL 10 MG
10 TABLET ORAL NIGHTLY PRN
Qty: 30 TABLET | Refills: 0 | Status: SHIPPED | OUTPATIENT
Start: 2024-03-21

## 2024-03-21 ASSESSMENT — PATIENT HEALTH QUESTIONNAIRE - PHQ9: CLINICAL INTERPRETATION OF PHQ2 SCORE: 0

## 2024-03-21 ASSESSMENT — FIBROSIS 4 INDEX: FIB4 SCORE: 0.85

## 2024-03-21 NOTE — PROGRESS NOTES
"Subjective:     CC: Muscle spasm, HLD    HPI:   Uzma presents today with    Problem   Muscle Spasm    This is a new condition.  Patient has a muscle spasm in the calf of the left leg.  She has been moving recently so has been very active.  She states that at night sometimes this wakes her from sleep.     Pure Hypercholesterolemia    Chronic condition, not currently on any medication    Lab Results   Component Value Date/Time    CHOLSTRLTOT 234 (H) 03/19/2024 09:21 AM     (H) 03/19/2024 09:21 AM    HDL 74 03/19/2024 09:21 AM    TRIGLYCERIDE 67 03/19/2024 09:21 AM       Lab Results   Component Value Date/Time    SODIUM 141 03/19/2024 09:21 AM    POTASSIUM 3.9 03/19/2024 09:21 AM    CHLORIDE 103 03/19/2024 09:21 AM    CO2 25 03/19/2024 09:21 AM    GLUCOSE 98 03/19/2024 09:21 AM    BUN 13 03/19/2024 09:21 AM    CREATININE 0.65 03/19/2024 09:21 AM     Lab Results   Component Value Date/Time    ALKPHOSPHAT 64 03/19/2024 09:21 AM    ASTSGOT 16 03/19/2024 09:21 AM    ALTSGPT 12 03/19/2024 09:21 AM    TBILIRUBIN 1.0 03/19/2024 09:21 AM             ROS:  ROS    Objective:     Exam:  /64 (BP Location: Left arm, Patient Position: Sitting, BP Cuff Size: Adult)   Pulse 63   Temp 36.8 °C (98.3 °F) (Temporal)   Resp 16   Ht 1.626 m (5' 4\")   Wt 65.6 kg (144 lb 9.6 oz)   SpO2 100%   BMI 24.82 kg/m²  Body mass index is 24.82 kg/m².    Physical Exam  Vitals reviewed.   Constitutional:       General: She is not in acute distress.     Appearance: She is not toxic-appearing.   HENT:      Head: Normocephalic and atraumatic.      Right Ear: External ear normal.      Left Ear: External ear normal.   Eyes:      General:         Right eye: No discharge.         Left eye: No discharge.      Extraocular Movements: Extraocular movements intact.      Conjunctiva/sclera: Conjunctivae normal.   Pulmonary:      Effort: Pulmonary effort is normal. No respiratory distress.   Skin:     General: Skin is warm and dry.   Neurological: "      Mental Status: She is alert.   Psychiatric:         Mood and Affect: Mood normal.         Behavior: Behavior normal.         Thought Content: Thought content normal.         Judgment: Judgment normal.           Assessment & Plan:     54 y.o. female with the following -     1. Muscle spasm  We discussed using Flexeril at night only to help with sleep.  We discussed that this can make her drowsy and not to mix with alcohol  - cyclobenzaprine (FLEXERIL) 10 mg Tab; Take 1 Tablet by mouth at bedtime as needed for Muscle Spasms.  Dispense: 30 Tablet; Refill: 0    2. Pure hypercholesterolemia  She is not taking medication right now.  We discussed that she does have a low ASCVD risk but due to your numbers increasing I would suggest a medication.  She does not want to take 1.  We discussed decreasing animal fats in the diet and increasing omega-3's.  Recheck in 6 months    3. Immunization due  - Tdap =>6yo IM      No follow-ups on file.    Please note that this dictation was created using voice recognition software. I have made every reasonable attempt to correct obvious errors, but I expect that there are errors of grammar and possibly content that I did not discover before finalizing the note.

## 2024-06-06 ENCOUNTER — APPOINTMENT (OUTPATIENT)
Dept: MEDICAL GROUP | Facility: MEDICAL CENTER | Age: 54
End: 2024-06-06
Payer: COMMERCIAL

## 2024-06-25 ENCOUNTER — OFFICE VISIT (OUTPATIENT)
Dept: MEDICAL GROUP | Facility: MEDICAL CENTER | Age: 54
End: 2024-06-25
Payer: COMMERCIAL

## 2024-06-25 VITALS
SYSTOLIC BLOOD PRESSURE: 126 MMHG | BODY MASS INDEX: 23.9 KG/M2 | WEIGHT: 140 LBS | HEART RATE: 74 BPM | OXYGEN SATURATION: 96 % | DIASTOLIC BLOOD PRESSURE: 74 MMHG | TEMPERATURE: 97.3 F | HEIGHT: 64 IN

## 2024-06-25 DIAGNOSIS — K21.9 GASTROESOPHAGEAL REFLUX DISEASE WITHOUT ESOPHAGITIS: ICD-10-CM

## 2024-06-25 DIAGNOSIS — L91.8 SKIN TAG: ICD-10-CM

## 2024-06-25 PROCEDURE — 99214 OFFICE O/P EST MOD 30 MIN: CPT | Performed by: STUDENT IN AN ORGANIZED HEALTH CARE EDUCATION/TRAINING PROGRAM

## 2024-06-25 PROCEDURE — 3078F DIAST BP <80 MM HG: CPT | Performed by: STUDENT IN AN ORGANIZED HEALTH CARE EDUCATION/TRAINING PROGRAM

## 2024-06-25 PROCEDURE — 3074F SYST BP LT 130 MM HG: CPT | Performed by: STUDENT IN AN ORGANIZED HEALTH CARE EDUCATION/TRAINING PROGRAM

## 2024-06-25 RX ORDER — PANTOPRAZOLE SODIUM 40 MG/1
40 TABLET, DELAYED RELEASE ORAL DAILY
Qty: 90 TABLET | Refills: 3 | Status: SHIPPED | OUTPATIENT
Start: 2024-06-25

## 2024-06-25 RX ORDER — FAMOTIDINE 20 MG/1
20 TABLET, FILM COATED ORAL 2 TIMES DAILY
Qty: 180 TABLET | Refills: 0 | Status: SHIPPED | OUTPATIENT
Start: 2024-06-25

## 2024-06-25 ASSESSMENT — FIBROSIS 4 INDEX: FIB4 SCORE: 0.85

## 2024-06-25 NOTE — PROGRESS NOTES
"Subjective:     CC: Skin tags, gerd    HPI:   Uzma presents today with worsening of her epigastric pain.  She states it is a burning sensation.  Worse when she is sitting or hunched over.  She takes pantoprazole and previously was taking Pepcid but is not right now.  She has not had endoscopic evaluation.    She also would like to talk about skin tags.  She has 2 small skin tags on the side of her neck.    ROS:  ROS    Objective:     Exam:  /74   Pulse 74   Temp 36.3 °C (97.3 °F)   Ht 1.626 m (5' 4\")   Wt 63.5 kg (140 lb)   SpO2 96%   BMI 24.03 kg/m²  Body mass index is 24.03 kg/m².    Physical Exam  Vitals reviewed.   Constitutional:       General: She is not in acute distress.     Appearance: She is not toxic-appearing.   HENT:      Head: Normocephalic and atraumatic.      Right Ear: External ear normal.      Left Ear: External ear normal.   Eyes:      General:         Right eye: No discharge.         Left eye: No discharge.      Extraocular Movements: Extraocular movements intact.      Conjunctiva/sclera: Conjunctivae normal.   Pulmonary:      Effort: Pulmonary effort is normal. No respiratory distress.   Skin:     General: Skin is warm and dry.      Comments: 2 pinpoint skin tags on the right side of her neck   Neurological:      Mental Status: She is alert.   Psychiatric:         Mood and Affect: Mood normal.         Behavior: Behavior normal.         Thought Content: Thought content normal.         Judgment: Judgment normal.           Assessment & Plan:     54 y.o. female with the following -     1. Gastroesophageal reflux disease without esophagitis  We discussed getting an endoscopic evaluation but patient does not want to at this time.  We discussed readding Pepcid and continuing Protonix and if symptoms do not resolve she will let me know and I will send a referral to gastroenterology  - famotidine (PEPCID) 20 MG Tab; Take 1 Tablet by mouth 2 times a day.  Dispense: 180 Tablet; Refill: 0  - " pantoprazole (PROTONIX) 40 MG Tablet Delayed Response; Take 1 Tablet by mouth every day.  Dispense: 90 Tablet; Refill: 3    2. Skin tag  We discussed that these are benign lesions.  If they grow or start to catch on clothing or jewelry then we can always remove them, but for now they are too small for cryotherapy      No follow-ups on file.    Please note that this dictation was created using voice recognition software. I have made every reasonable attempt to correct obvious errors, but I expect that there are errors of grammar and possibly content that I did not discover before finalizing the note.

## 2024-07-02 ENCOUNTER — APPOINTMENT (OUTPATIENT)
Dept: MEDICAL GROUP | Facility: MEDICAL CENTER | Age: 54
End: 2024-07-02
Payer: COMMERCIAL

## 2024-08-22 ENCOUNTER — APPOINTMENT (OUTPATIENT)
Dept: MEDICAL GROUP | Facility: MEDICAL CENTER | Age: 54
End: 2024-08-22
Payer: COMMERCIAL

## 2024-08-22 VITALS
SYSTOLIC BLOOD PRESSURE: 130 MMHG | BODY MASS INDEX: 23.29 KG/M2 | HEIGHT: 64 IN | DIASTOLIC BLOOD PRESSURE: 52 MMHG | OXYGEN SATURATION: 98 % | TEMPERATURE: 97.4 F | WEIGHT: 136.4 LBS | HEART RATE: 71 BPM | RESPIRATION RATE: 16 BRPM

## 2024-08-22 DIAGNOSIS — M62.838 MUSCLE SPASM: ICD-10-CM

## 2024-08-22 DIAGNOSIS — F51.01 PRIMARY INSOMNIA: ICD-10-CM

## 2024-08-22 DIAGNOSIS — B00.1 COLD SORE: ICD-10-CM

## 2024-08-22 DIAGNOSIS — M79.672 PAIN OF LEFT HEEL: ICD-10-CM

## 2024-08-22 DIAGNOSIS — K21.9 GASTROESOPHAGEAL REFLUX DISEASE WITHOUT ESOPHAGITIS: ICD-10-CM

## 2024-08-22 PROCEDURE — 3075F SYST BP GE 130 - 139MM HG: CPT | Performed by: STUDENT IN AN ORGANIZED HEALTH CARE EDUCATION/TRAINING PROGRAM

## 2024-08-22 PROCEDURE — 3078F DIAST BP <80 MM HG: CPT | Performed by: STUDENT IN AN ORGANIZED HEALTH CARE EDUCATION/TRAINING PROGRAM

## 2024-08-22 PROCEDURE — 99214 OFFICE O/P EST MOD 30 MIN: CPT | Performed by: STUDENT IN AN ORGANIZED HEALTH CARE EDUCATION/TRAINING PROGRAM

## 2024-08-22 RX ORDER — PANTOPRAZOLE SODIUM 40 MG/1
40 TABLET, DELAYED RELEASE ORAL DAILY
Qty: 90 TABLET | Refills: 3 | Status: SHIPPED | OUTPATIENT
Start: 2024-08-22

## 2024-08-22 RX ORDER — VALACYCLOVIR HYDROCHLORIDE 1 G/1
1000 TABLET, FILM COATED ORAL 2 TIMES DAILY
Qty: 40 TABLET | Refills: 0 | Status: SHIPPED | OUTPATIENT
Start: 2024-08-22

## 2024-08-22 RX ORDER — MELOXICAM 7.5 MG/1
7.5 TABLET ORAL DAILY
Qty: 30 TABLET | Refills: 0 | Status: SHIPPED | OUTPATIENT
Start: 2024-08-22

## 2024-08-22 RX ORDER — CYCLOBENZAPRINE HCL 10 MG
10 TABLET ORAL NIGHTLY PRN
Qty: 30 TABLET | Refills: 0 | Status: SHIPPED | OUTPATIENT
Start: 2024-08-22

## 2024-08-22 ASSESSMENT — FIBROSIS 4 INDEX: FIB4 SCORE: 0.85

## 2024-08-22 NOTE — PROGRESS NOTES
"Subjective:     CC: Insomnia, muscle spasm, cold sore, gastroesophageal reflux    HPI:   Uzma presents today with    Problem   Primary Insomnia    This is a chronic condition, she is not open to starting medication at this time.  Would like nonmedication management     Muscle Spasm    This is a chronic condition.  Patient has a muscle spasm in the calf of the left leg.  She tries to walk 8 to 10 miles a day and states this is probably what is causing it     Cold Sore    Recurrent condition, happens about 4 times a year     Gastroesophageal Reflux Disease Without Esophagitis    This is a chronic condition.  Well-controlled with pantoprazole       ROS:  ROS    Objective:     Exam:  /52 (BP Location: Left arm, Patient Position: Sitting, BP Cuff Size: Adult)   Pulse 71   Temp 36.3 °C (97.4 °F) (Temporal)   Resp 16   Ht 1.626 m (5' 4\")   Wt 61.9 kg (136 lb 6.4 oz)   SpO2 98%   BMI 23.41 kg/m²  Body mass index is 23.41 kg/m².    Physical Exam  Vitals reviewed.   Constitutional:       General: She is not in acute distress.     Appearance: She is not toxic-appearing.   HENT:      Head: Normocephalic and atraumatic.      Right Ear: External ear normal.      Left Ear: External ear normal.   Eyes:      General:         Right eye: No discharge.         Left eye: No discharge.      Extraocular Movements: Extraocular movements intact.      Conjunctiva/sclera: Conjunctivae normal.   Pulmonary:      Effort: Pulmonary effort is normal. No respiratory distress.   Skin:     General: Skin is warm and dry.   Neurological:      Mental Status: She is alert.   Psychiatric:         Mood and Affect: Mood normal.         Behavior: Behavior normal.         Thought Content: Thought content normal.         Judgment: Judgment normal.           Assessment & Plan:     54 y.o. female with the following -     1. Muscle spasm  Refill given  - cyclobenzaprine (FLEXERIL) 10 mg Tab; Take 1 Tablet by mouth at bedtime as needed for Muscle " Spasms.  Dispense: 30 Tablet; Refill: 0    2. Cold sore  Refill given  - valacyclovir (VALTREX) 1 GM Tab; Take 1 Tablet by mouth 2 times a day. Take for 3-5 days at the first sign of cold sore  Dispense: 40 Tablet; Refill: 0    3. Pain of left heel  We discussed trying an anti-inflammatory due to pain of the left heel  - meloxicam (MOBIC) 7.5 MG Tab; Take 1 Tablet by mouth every day.  Dispense: 30 Tablet; Refill: 0    4. Gastroesophageal reflux disease without esophagitis  Refill given  - pantoprazole (PROTONIX) 40 MG Tablet Delayed Response; Take 1 Tablet by mouth every day.  Dispense: 90 Tablet; Refill: 3    5. Primary insomnia  We discussed trying over-the-counter magnesium and melatonin and if this does not work she can let me know        No follow-ups on file.    Please note that this dictation was created using voice recognition software. I have made every reasonable attempt to correct obvious errors, but I expect that there are errors of grammar and possibly content that I did not discover before finalizing the note.

## 2024-09-05 DIAGNOSIS — R05.2 SUBACUTE COUGH: ICD-10-CM

## 2024-09-05 RX ORDER — DEXTROMETHORPHAN HYDROBROMIDE AND PROMETHAZINE HYDROCHLORIDE 15; 6.25 MG/5ML; MG/5ML
5 SYRUP ORAL EVERY 4 HOURS PRN
Qty: 473 ML | Refills: 0 | Status: SHIPPED | OUTPATIENT
Start: 2024-09-05

## 2024-10-08 ENCOUNTER — OFFICE VISIT (OUTPATIENT)
Dept: MEDICAL GROUP | Age: 54
End: 2024-10-08
Payer: COMMERCIAL

## 2024-10-08 ENCOUNTER — APPOINTMENT (OUTPATIENT)
Dept: URGENT CARE | Facility: CLINIC | Age: 54
End: 2024-10-08
Payer: COMMERCIAL

## 2024-10-08 VITALS
OXYGEN SATURATION: 99 % | DIASTOLIC BLOOD PRESSURE: 72 MMHG | WEIGHT: 136 LBS | HEIGHT: 64 IN | HEART RATE: 71 BPM | BODY MASS INDEX: 23.22 KG/M2 | TEMPERATURE: 97.4 F | SYSTOLIC BLOOD PRESSURE: 118 MMHG

## 2024-10-08 DIAGNOSIS — R30.0 DYSURIA: ICD-10-CM

## 2024-10-08 DIAGNOSIS — R30.9 PAINFUL URINATION: ICD-10-CM

## 2024-10-08 DIAGNOSIS — N30.00 ACUTE CYSTITIS WITHOUT HEMATURIA: ICD-10-CM

## 2024-10-08 DIAGNOSIS — K21.9 GASTROESOPHAGEAL REFLUX DISEASE WITHOUT ESOPHAGITIS: ICD-10-CM

## 2024-10-08 DIAGNOSIS — R35.0 URINARY FREQUENCY: ICD-10-CM

## 2024-10-08 LAB
APPEARANCE UR: CLEAR
BILIRUB UR STRIP-MCNC: NEGATIVE MG/DL
COLOR UR AUTO: YELLOW
GLUCOSE UR STRIP.AUTO-MCNC: NEGATIVE MG/DL
KETONES UR STRIP.AUTO-MCNC: NEGATIVE MG/DL
LEUKOCYTE ESTERASE UR QL STRIP.AUTO: NEGATIVE
NITRITE UR QL STRIP.AUTO: NEGATIVE
PH UR STRIP.AUTO: 6.5 [PH] (ref 5–8)
PROT UR QL STRIP: NEGATIVE MG/DL
RBC UR QL AUTO: NORMAL
SP GR UR STRIP.AUTO: 1.02
UROBILINOGEN UR STRIP-MCNC: 0.2 MG/DL

## 2024-10-08 PROCEDURE — 3078F DIAST BP <80 MM HG: CPT | Performed by: STUDENT IN AN ORGANIZED HEALTH CARE EDUCATION/TRAINING PROGRAM

## 2024-10-08 PROCEDURE — 3074F SYST BP LT 130 MM HG: CPT | Performed by: STUDENT IN AN ORGANIZED HEALTH CARE EDUCATION/TRAINING PROGRAM

## 2024-10-08 PROCEDURE — 81002 URINALYSIS NONAUTO W/O SCOPE: CPT | Performed by: STUDENT IN AN ORGANIZED HEALTH CARE EDUCATION/TRAINING PROGRAM

## 2024-10-08 PROCEDURE — 99214 OFFICE O/P EST MOD 30 MIN: CPT | Performed by: STUDENT IN AN ORGANIZED HEALTH CARE EDUCATION/TRAINING PROGRAM

## 2024-10-08 RX ORDER — NITROFURANTOIN 25; 75 MG/1; MG/1
100 CAPSULE ORAL EVERY 12 HOURS
Qty: 10 CAPSULE | Refills: 0 | Status: SHIPPED | OUTPATIENT
Start: 2024-10-08 | End: 2024-10-13

## 2024-10-08 ASSESSMENT — ENCOUNTER SYMPTOMS
FEVER: 0
ORTHOPNEA: 0
HEARTBURN: 0
COUGH: 0
SENSORY CHANGE: 0
BLOOD IN STOOL: 0
SHORTNESS OF BREATH: 0
BLURRED VISION: 0
CHILLS: 0
BACK PAIN: 0
HEADACHES: 0
WEAKNESS: 0
WHEEZING: 0
BRUISES/BLEEDS EASILY: 0
ABDOMINAL PAIN: 0
DIZZINESS: 0
PALPITATIONS: 0
DOUBLE VISION: 0
DEPRESSION: 0

## 2024-10-08 ASSESSMENT — FIBROSIS 4 INDEX: FIB4 SCORE: 0.85

## 2024-11-11 DIAGNOSIS — M62.838 MUSCLE SPASM: ICD-10-CM

## 2024-11-12 RX ORDER — CYCLOBENZAPRINE HCL 10 MG
10 TABLET ORAL NIGHTLY PRN
Qty: 30 TABLET | Refills: 0 | Status: SHIPPED | OUTPATIENT
Start: 2024-11-12

## 2024-11-12 NOTE — TELEPHONE ENCOUNTER
Received request via: Pharmacy    Was the patient seen in the last year in this department? Yes    Does the patient have an active prescription (recently filled or refills available) for medication(s) requested? No    Pharmacy Name: [facility]    Does the patient have skilled nursing Plus and need 100-day supply? (This applies to ALL medications) Patient does not have SCP

## 2024-12-26 DIAGNOSIS — N95.1 MENOPAUSAL SYNDROME ON HORMONE REPLACEMENT THERAPY: ICD-10-CM

## 2024-12-26 DIAGNOSIS — Z79.890 MENOPAUSAL SYNDROME ON HORMONE REPLACEMENT THERAPY: ICD-10-CM

## 2024-12-26 RX ORDER — ESTROGEN,CON/M-PROGEST ACET 0.3-1.5MG
1 TABLET ORAL DAILY
Qty: 84 TABLET | Refills: 0 | Status: SHIPPED | OUTPATIENT
Start: 2024-12-26

## 2025-01-23 ENCOUNTER — APPOINTMENT (OUTPATIENT)
Dept: RADIOLOGY | Facility: MEDICAL CENTER | Age: 55
End: 2025-01-23
Attending: STUDENT IN AN ORGANIZED HEALTH CARE EDUCATION/TRAINING PROGRAM
Payer: COMMERCIAL

## 2025-01-23 ENCOUNTER — OFFICE VISIT (OUTPATIENT)
Dept: MEDICAL GROUP | Facility: MEDICAL CENTER | Age: 55
End: 2025-01-23
Payer: COMMERCIAL

## 2025-01-23 VITALS
OXYGEN SATURATION: 98 % | WEIGHT: 139.54 LBS | BODY MASS INDEX: 23.82 KG/M2 | TEMPERATURE: 97 F | DIASTOLIC BLOOD PRESSURE: 52 MMHG | HEART RATE: 67 BPM | HEIGHT: 64 IN | SYSTOLIC BLOOD PRESSURE: 110 MMHG

## 2025-01-23 DIAGNOSIS — G89.29 CHRONIC MIDLINE LOW BACK PAIN WITHOUT SCIATICA: ICD-10-CM

## 2025-01-23 DIAGNOSIS — M54.50 CHRONIC MIDLINE LOW BACK PAIN WITHOUT SCIATICA: ICD-10-CM

## 2025-01-23 DIAGNOSIS — M62.838 MUSCLE SPASM: ICD-10-CM

## 2025-01-23 DIAGNOSIS — K21.9 GASTROESOPHAGEAL REFLUX DISEASE WITHOUT ESOPHAGITIS: ICD-10-CM

## 2025-01-23 PROCEDURE — 3078F DIAST BP <80 MM HG: CPT | Performed by: STUDENT IN AN ORGANIZED HEALTH CARE EDUCATION/TRAINING PROGRAM

## 2025-01-23 PROCEDURE — 99214 OFFICE O/P EST MOD 30 MIN: CPT | Performed by: STUDENT IN AN ORGANIZED HEALTH CARE EDUCATION/TRAINING PROGRAM

## 2025-01-23 PROCEDURE — 3074F SYST BP LT 130 MM HG: CPT | Performed by: STUDENT IN AN ORGANIZED HEALTH CARE EDUCATION/TRAINING PROGRAM

## 2025-01-23 PROCEDURE — 72100 X-RAY EXAM L-S SPINE 2/3 VWS: CPT

## 2025-01-23 RX ORDER — FAMOTIDINE 20 MG/1
20 TABLET, FILM COATED ORAL 2 TIMES DAILY
Qty: 180 TABLET | Refills: 0 | Status: SHIPPED | OUTPATIENT
Start: 2025-01-23

## 2025-01-23 RX ORDER — CYCLOBENZAPRINE HCL 10 MG
10 TABLET ORAL NIGHTLY PRN
Qty: 30 TABLET | Refills: 0 | Status: SHIPPED | OUTPATIENT
Start: 2025-01-23

## 2025-01-23 RX ORDER — DICLOFENAC SODIUM 30 MG/G
GEL TOPICAL
Qty: 100 G | Refills: 1 | Status: SHIPPED | OUTPATIENT
Start: 2025-01-23

## 2025-01-23 ASSESSMENT — PATIENT HEALTH QUESTIONNAIRE - PHQ9: CLINICAL INTERPRETATION OF PHQ2 SCORE: 0

## 2025-01-23 ASSESSMENT — FIBROSIS 4 INDEX: FIB4 SCORE: 0.85

## 2025-01-23 NOTE — PROGRESS NOTES
"Subjective:     CC: Pain, muscle spasms    HPI:   Uzma presents today with chronic midline back pain.  This is in the lower back.  It has been going on for some time but now she feels like it is worse in the mornings.  It would get better throughout the day.  She also has the sensation of cramping muscles in the left calf.  She walks about 6 miles a day and does not notice it during exercise.  She states at night is 1 is the worst.     ROS:  ROS    Objective:     Exam:  /52 (BP Location: Left arm, Patient Position: Sitting, BP Cuff Size: Adult)   Pulse 67   Temp 36.1 °C (97 °F) (Temporal)   Ht 1.626 m (5' 4\")   Wt 63.3 kg (139 lb 8.6 oz)   SpO2 98%   BMI 23.95 kg/m²  Body mass index is 23.95 kg/m².    Physical Exam  Vitals reviewed.   Constitutional:       General: She is not in acute distress.     Appearance: She is not toxic-appearing.   HENT:      Head: Normocephalic and atraumatic.      Right Ear: External ear normal.      Left Ear: External ear normal.   Eyes:      General:         Right eye: No discharge.         Left eye: No discharge.      Extraocular Movements: Extraocular movements intact.      Conjunctiva/sclera: Conjunctivae normal.   Pulmonary:      Effort: Pulmonary effort is normal. No respiratory distress.   Skin:     General: Skin is warm and dry.   Neurological:      Mental Status: She is alert.   Psychiatric:         Mood and Affect: Mood normal.         Behavior: Behavior normal.         Thought Content: Thought content normal.         Judgment: Judgment normal.           Assessment & Plan:     54 y.o. female with the following -     1. Muscle spasm  Refill of Flexeril given.  Discussed physical therapy for back and calf.  She declines physical therapy for now.  She will try diclofenac gel  - cyclobenzaprine (FLEXERIL) 10 mg Tab; Take 1 Tablet by mouth at bedtime as needed for Muscle Spasms.  Dispense: 30 Tablet; Refill: 0  - diclofenac sodium 3 % Gel; Apply 1 gram to affected area up " to 4 times a day as needed  Dispense: 100 g; Refill: 1    2. Gastroesophageal reflux disease without esophagitis  Refill given  - famotidine (PEPCID) 20 MG Tab; Take 1 Tablet by mouth 2 times a day.  Dispense: 180 Tablet; Refill: 0    3. Chronic midline low back pain without sciatica  Lumbar x-ray ordered.  Discussed physical therapy which she declines for now  - cyclobenzaprine (FLEXERIL) 10 mg Tab; Take 1 Tablet by mouth at bedtime as needed for Muscle Spasms.  Dispense: 30 Tablet; Refill: 0  - diclofenac sodium 3 % Gel; Apply 1 gram to affected area up to 4 times a day as needed  Dispense: 100 g; Refill: 1  - DX-LUMBAR SPINE-2 OR 3 VIEWS; Future        No follow-ups on file.    Please note that this dictation was created using voice recognition software. I have made every reasonable attempt to correct obvious errors, but I expect that there are errors of grammar and possibly content that I did not discover before finalizing the note.

## 2025-01-24 NOTE — RESULT ENCOUNTER NOTE
Please call patient and let her know that she has degenerative disc space narrowing in the lower back which might be causing her symptoms.  You can offer her physical therapy or a referral to a spine doctor if she likes    Thank You,  Dr. Hinkle

## 2025-02-28 ENCOUNTER — APPOINTMENT (OUTPATIENT)
Dept: MEDICAL GROUP | Facility: MEDICAL CENTER | Age: 55
End: 2025-02-28
Payer: COMMERCIAL

## 2025-02-28 VITALS
SYSTOLIC BLOOD PRESSURE: 118 MMHG | DIASTOLIC BLOOD PRESSURE: 76 MMHG | OXYGEN SATURATION: 99 % | BODY MASS INDEX: 24.55 KG/M2 | HEART RATE: 70 BPM | HEIGHT: 64 IN | WEIGHT: 143.8 LBS | TEMPERATURE: 97.2 F

## 2025-02-28 DIAGNOSIS — B00.1 COLD SORE: ICD-10-CM

## 2025-02-28 DIAGNOSIS — Z00.00 ANNUAL PHYSICAL EXAM: ICD-10-CM

## 2025-02-28 DIAGNOSIS — R05.2 SUBACUTE COUGH: ICD-10-CM

## 2025-02-28 DIAGNOSIS — K21.9 GASTROESOPHAGEAL REFLUX DISEASE WITHOUT ESOPHAGITIS: ICD-10-CM

## 2025-02-28 RX ORDER — VALACYCLOVIR HYDROCHLORIDE 1 G/1
1000 TABLET, FILM COATED ORAL 2 TIMES DAILY
Qty: 40 TABLET | Refills: 0 | Status: SHIPPED | OUTPATIENT
Start: 2025-02-28

## 2025-02-28 RX ORDER — METHYLPREDNISOLONE 4 MG/1
4 TABLET ORAL DAILY
Qty: 30 TABLET | Refills: 0 | Status: SHIPPED | OUTPATIENT
Start: 2025-02-28

## 2025-02-28 RX ORDER — FAMOTIDINE 20 MG/1
20 TABLET, FILM COATED ORAL 2 TIMES DAILY
Qty: 180 TABLET | Refills: 0 | Status: SHIPPED | OUTPATIENT
Start: 2025-02-28

## 2025-02-28 ASSESSMENT — FIBROSIS 4 INDEX: FIB4 SCORE: 0.85

## 2025-02-28 NOTE — PROGRESS NOTES
"Subjective:     CC: Annual exam, cough    HPI:   Uzma presents today for an annual exam.  She is due for mammogram which she would like to push off till next year.  She is due for a Pap smear and she does not want to do today.  She is also due for Cologuard testing which she does not want me to order today.  She does not want any vaccines today.  She is generally healthy and eats well and walks 6 miles daily.    She has had a subacute cough for the last 4 weeks.  It started with a cold but the cough is lingering despite her other symptoms resolving.  She has tried cough medicine without results.    ROS:  ROS    Objective:     Exam:  /76 (BP Location: Left arm, Patient Position: Sitting, BP Cuff Size: Adult)   Pulse 70   Temp 36.2 °C (97.2 °F) (Temporal)   Ht 1.626 m (5' 4\")   Wt 65.2 kg (143 lb 12.8 oz)   SpO2 99%   BMI 24.68 kg/m²  Body mass index is 24.68 kg/m².    Physical Exam  Vitals reviewed.   Constitutional:       General: She is not in acute distress.     Appearance: She is normal weight. She is not toxic-appearing.   HENT:      Head: Normocephalic and atraumatic.      Right Ear: Tympanic membrane, ear canal and external ear normal. There is no impacted cerumen.      Left Ear: Tympanic membrane, ear canal and external ear normal. There is no impacted cerumen.      Nose: Nose normal. No congestion.      Mouth/Throat:      Mouth: Mucous membranes are moist.      Pharynx: Oropharynx is clear. No oropharyngeal exudate.   Eyes:      General:         Right eye: No discharge.         Left eye: No discharge.      Extraocular Movements: Extraocular movements intact.      Conjunctiva/sclera: Conjunctivae normal.   Cardiovascular:      Rate and Rhythm: Normal rate and regular rhythm.      Pulses: Normal pulses.      Heart sounds: Normal heart sounds. No murmur heard.  Pulmonary:      Effort: Pulmonary effort is normal. No respiratory distress.      Breath sounds: Normal breath sounds.   Abdominal:      " General: Abdomen is flat. Bowel sounds are normal. There is no distension.      Palpations: Abdomen is soft.      Tenderness: There is no abdominal tenderness.   Musculoskeletal:         General: Normal range of motion.   Skin:     General: Skin is warm and dry.      Capillary Refill: Capillary refill takes less than 2 seconds.   Neurological:      Mental Status: She is alert.   Psychiatric:         Mood and Affect: Mood normal.         Behavior: Behavior normal.         Thought Content: Thought content normal.         Judgment: Judgment normal.           Assessment & Plan:     54 y.o. female with the following -     1. Annual physical exam  We discussed routine screening vaccination.  She declines all for now.  She is open to getting mammogram next year.  Continue to eat a generally healthy diet and walk daily    2. Cold sore  Refill given  - valacyclovir (VALTREX) 1 GM Tab; Take 1 Tablet by mouth 2 times a day. Take for 3-5 days at the first sign of cold sore  Dispense: 40 Tablet; Refill: 0    3. Subacute cough  This is a subacute condition.  I do not think that she needs antibiotics due to her lack of nasal discharge and clear lungs on exam.  I have given her a Medrol Dosepak to help with inflammation and the cough  - methylPREDNISolone (MEDROL) 4 MG Tab; Take 1 Tablet by mouth every day.  Dispense: 30 Tablet; Refill: 0    4. Gastroesophageal reflux disease without esophagitis  - famotidine (PEPCID) 20 MG Tab; Take 1 Tablet by mouth 2 times a day.  Dispense: 180 Tablet; Refill: 0      No follow-ups on file.    Please note that this dictation was created using voice recognition software. I have made every reasonable attempt to correct obvious errors, but I expect that there are errors of grammar and possibly content that I did not discover before finalizing the note.

## 2025-03-15 DIAGNOSIS — N95.1 MENOPAUSAL SYNDROME ON HORMONE REPLACEMENT THERAPY: ICD-10-CM

## 2025-03-15 DIAGNOSIS — Z79.890 MENOPAUSAL SYNDROME ON HORMONE REPLACEMENT THERAPY: ICD-10-CM

## 2025-03-17 RX ORDER — ESTROGEN,CON/M-PROGEST ACET 0.3-1.5MG
1 TABLET ORAL DAILY
Qty: 84 TABLET | Refills: 3 | Status: SHIPPED | OUTPATIENT
Start: 2025-03-17

## 2025-04-02 ENCOUNTER — APPOINTMENT (OUTPATIENT)
Dept: MEDICAL GROUP | Facility: MEDICAL CENTER | Age: 55
End: 2025-04-02
Payer: COMMERCIAL

## 2025-04-02 VITALS
BODY MASS INDEX: 23.87 KG/M2 | SYSTOLIC BLOOD PRESSURE: 114 MMHG | OXYGEN SATURATION: 98 % | WEIGHT: 139.8 LBS | HEIGHT: 64 IN | DIASTOLIC BLOOD PRESSURE: 68 MMHG | TEMPERATURE: 97 F | HEART RATE: 69 BPM

## 2025-04-02 DIAGNOSIS — R10.13 EPIGASTRIC PAIN: ICD-10-CM

## 2025-04-02 DIAGNOSIS — R05.2 SUBACUTE COUGH: ICD-10-CM

## 2025-04-02 PROCEDURE — 3078F DIAST BP <80 MM HG: CPT | Performed by: STUDENT IN AN ORGANIZED HEALTH CARE EDUCATION/TRAINING PROGRAM

## 2025-04-02 PROCEDURE — 3074F SYST BP LT 130 MM HG: CPT | Performed by: STUDENT IN AN ORGANIZED HEALTH CARE EDUCATION/TRAINING PROGRAM

## 2025-04-02 PROCEDURE — 99214 OFFICE O/P EST MOD 30 MIN: CPT | Performed by: STUDENT IN AN ORGANIZED HEALTH CARE EDUCATION/TRAINING PROGRAM

## 2025-04-02 RX ORDER — SUCRALFATE ORAL 1 G/10ML
1 SUSPENSION ORAL 4 TIMES DAILY
Qty: 414 ML | Refills: 0 | Status: SHIPPED | OUTPATIENT
Start: 2025-04-02 | End: 2025-04-13 | Stop reason: SDUPTHER

## 2025-04-02 RX ORDER — DEXTROMETHORPHAN HYDROBROMIDE AND PROMETHAZINE HYDROCHLORIDE 15; 6.25 MG/5ML; MG/5ML
5 SYRUP ORAL EVERY 4 HOURS PRN
Qty: 473 ML | Refills: 0 | Status: SHIPPED | OUTPATIENT
Start: 2025-04-02

## 2025-04-02 ASSESSMENT — FIBROSIS 4 INDEX: FIB4 SCORE: 0.86

## 2025-04-02 NOTE — PROGRESS NOTES
"Subjective:     CC: Gastric pain, cough    HPI:   Uzma presents today with worsening epigastric pain.  She says for the last several days she has had a pain in epigastric region.  It feels similar to the pain for which she started taking the pantoprazole and Pepcid.  These have not been helping her symptoms this time.  The pain is worse when she is sitting down and better when she is sitting up.    She also has a history of recurrent cough that has done well with promethazine in the past.  She is asking for refill      ROS:  ROS    Objective:     Exam:  /68 (BP Location: Left arm, Patient Position: Sitting, BP Cuff Size: Adult)   Pulse 69   Temp 36.1 °C (97 °F) (Temporal)   Ht 1.626 m (5' 4\")   Wt 63.4 kg (139 lb 12.8 oz)   SpO2 98%   BMI 24.00 kg/m²  Body mass index is 24 kg/m².    Physical Exam  Vitals reviewed.   Constitutional:       General: She is not in acute distress.     Appearance: She is not toxic-appearing.   HENT:      Head: Normocephalic and atraumatic.      Right Ear: External ear normal.      Left Ear: External ear normal.   Eyes:      General:         Right eye: No discharge.         Left eye: No discharge.      Extraocular Movements: Extraocular movements intact.      Conjunctiva/sclera: Conjunctivae normal.   Pulmonary:      Effort: Pulmonary effort is normal. No respiratory distress.   Skin:     General: Skin is warm and dry.   Neurological:      Mental Status: She is alert.   Psychiatric:         Mood and Affect: Mood normal.         Behavior: Behavior normal.         Thought Content: Thought content normal.         Judgment: Judgment normal.           Assessment & Plan:     55 y.o. female with the following -     1. Epigastric pain  We discussed getting an endoscopy.  Patient does not want an endoscopy at this time.  I have added sucralfate to her regimen.  - sucralfate (CARAFATE) 1 GM/10ML Suspension; Take 10 mL by mouth 4 times a day.  Dispense: 414 mL; Refill: 0    2. Subacute " cough  Refill given  - promethazine-dextromethorphan (PROMETHAZINE-DM) 6.25-15 MG/5ML syrup; Take 5 mL by mouth every four hours as needed for Cough.  Dispense: 473 mL; Refill: 0      No follow-ups on file.    Please note that this dictation was created using voice recognition software. I have made every reasonable attempt to correct obvious errors, but I expect that there are errors of grammar and possibly content that I did not discover before finalizing the note.

## 2025-04-13 DIAGNOSIS — R10.13 EPIGASTRIC PAIN: ICD-10-CM

## 2025-04-14 RX ORDER — SUCRALFATE ORAL 1 G/10ML
1 SUSPENSION ORAL 4 TIMES DAILY
Qty: 414 ML | Refills: 0 | Status: SHIPPED | OUTPATIENT
Start: 2025-04-14

## 2025-04-15 ENCOUNTER — TELEPHONE (OUTPATIENT)
Dept: MEDICAL GROUP | Facility: MEDICAL CENTER | Age: 55
End: 2025-04-15
Payer: COMMERCIAL

## 2025-04-15 NOTE — TELEPHONE ENCOUNTER
DOCUMENTATION OF PAR STATUS:    1. Name of Medication & Dose: Sucralfate 1GM/10ML suspension     2. Name of Prescription Coverage Company & phone #: [employer]    3. Date Prior Auth Submitted: 4/15/25    4. What information was given to obtain insurance decision? Office notes    5. Prior Auth Status? Pending    6. Patient Notified: yes    Key: [id number]

## 2025-04-18 ENCOUNTER — APPOINTMENT (OUTPATIENT)
Dept: MEDICAL GROUP | Facility: MEDICAL CENTER | Age: 55
End: 2025-04-18
Payer: COMMERCIAL

## 2025-06-20 DIAGNOSIS — M54.50 CHRONIC MIDLINE LOW BACK PAIN WITHOUT SCIATICA: ICD-10-CM

## 2025-06-20 DIAGNOSIS — G89.29 CHRONIC MIDLINE LOW BACK PAIN WITHOUT SCIATICA: ICD-10-CM

## 2025-06-20 DIAGNOSIS — M62.838 MUSCLE SPASM: ICD-10-CM

## 2025-06-23 RX ORDER — CYCLOBENZAPRINE HCL 10 MG
10 TABLET ORAL NIGHTLY PRN
Qty: 30 TABLET | Refills: 0 | Status: SHIPPED | OUTPATIENT
Start: 2025-06-23

## 2025-07-08 DIAGNOSIS — K21.9 GASTROESOPHAGEAL REFLUX DISEASE WITHOUT ESOPHAGITIS: ICD-10-CM

## 2025-07-09 RX ORDER — PANTOPRAZOLE SODIUM 40 MG/1
40 TABLET, DELAYED RELEASE ORAL DAILY
Qty: 90 TABLET | Refills: 3 | Status: SHIPPED | OUTPATIENT
Start: 2025-07-09

## 2025-08-09 DIAGNOSIS — R05.2 SUBACUTE COUGH: ICD-10-CM

## 2025-08-11 RX ORDER — DEXTROMETHORPHAN HYDROBROMIDE AND PROMETHAZINE HYDROCHLORIDE 15; 6.25 MG/5ML; MG/5ML
5 SYRUP ORAL EVERY 4 HOURS PRN
Qty: 473 ML | Refills: 0 | Status: SHIPPED | OUTPATIENT
Start: 2025-08-11

## 2025-08-29 ENCOUNTER — RESULTS FOLLOW-UP (OUTPATIENT)
Dept: MEDICAL GROUP | Facility: MEDICAL CENTER | Age: 55
End: 2025-08-29

## 2025-08-29 ENCOUNTER — OFFICE VISIT (OUTPATIENT)
Dept: MEDICAL GROUP | Facility: MEDICAL CENTER | Age: 55
End: 2025-08-29
Payer: COMMERCIAL

## 2025-08-29 ENCOUNTER — APPOINTMENT (OUTPATIENT)
Dept: RADIOLOGY | Facility: MEDICAL CENTER | Age: 55
End: 2025-08-29
Attending: STUDENT IN AN ORGANIZED HEALTH CARE EDUCATION/TRAINING PROGRAM
Payer: COMMERCIAL

## 2025-08-29 VITALS
SYSTOLIC BLOOD PRESSURE: 110 MMHG | OXYGEN SATURATION: 99 % | HEIGHT: 64 IN | WEIGHT: 140 LBS | DIASTOLIC BLOOD PRESSURE: 78 MMHG | TEMPERATURE: 97.3 F | BODY MASS INDEX: 23.9 KG/M2 | HEART RATE: 76 BPM

## 2025-08-29 DIAGNOSIS — M79.671 RIGHT FOOT PAIN: ICD-10-CM

## 2025-08-29 DIAGNOSIS — M79.671 RIGHT FOOT PAIN: Primary | ICD-10-CM

## 2025-08-29 PROCEDURE — 73630 X-RAY EXAM OF FOOT: CPT | Mod: RT

## 2025-08-29 ASSESSMENT — FIBROSIS 4 INDEX: FIB4 SCORE: 0.86
